# Patient Record
Sex: FEMALE | Race: BLACK OR AFRICAN AMERICAN | NOT HISPANIC OR LATINO | URBAN - METROPOLITAN AREA
[De-identification: names, ages, dates, MRNs, and addresses within clinical notes are randomized per-mention and may not be internally consistent; named-entity substitution may affect disease eponyms.]

---

## 2018-08-27 ENCOUNTER — OUTPATIENT (OUTPATIENT)
Dept: OUTPATIENT SERVICES | Facility: HOSPITAL | Age: 23
LOS: 1 days | End: 2018-08-27

## 2018-08-27 ENCOUNTER — EMERGENCY (EMERGENCY)
Facility: HOSPITAL | Age: 23
LOS: 1 days | Discharge: HOME | End: 2018-08-27
Attending: EMERGENCY MEDICINE | Admitting: EMERGENCY MEDICINE
Payer: MEDICAID

## 2018-08-27 ENCOUNTER — RESULT CHARGE (OUTPATIENT)
Age: 23
End: 2018-08-27

## 2018-08-27 ENCOUNTER — APPOINTMENT (OUTPATIENT)
Dept: OBGYN | Facility: HOSPITAL | Age: 23
End: 2018-08-27

## 2018-08-27 VITALS
HEART RATE: 82 BPM | OXYGEN SATURATION: 100 % | SYSTOLIC BLOOD PRESSURE: 122 MMHG | DIASTOLIC BLOOD PRESSURE: 82 MMHG | RESPIRATION RATE: 18 BRPM | TEMPERATURE: 98 F

## 2018-08-27 VITALS
TEMPERATURE: 98 F | RESPIRATION RATE: 18 BRPM | HEART RATE: 74 BPM | DIASTOLIC BLOOD PRESSURE: 83 MMHG | SYSTOLIC BLOOD PRESSURE: 134 MMHG | OXYGEN SATURATION: 100 %

## 2018-08-27 DIAGNOSIS — N91.2 AMENORRHEA, UNSPECIFIED: ICD-10-CM

## 2018-08-27 PROBLEM — Z00.00 ENCOUNTER FOR PREVENTIVE HEALTH EXAMINATION: Status: ACTIVE | Noted: 2018-08-27

## 2018-08-27 LAB
ALBUMIN SERPL ELPH-MCNC: 3.7 G/DL — SIGNIFICANT CHANGE UP (ref 3.3–5)
ALP SERPL-CCNC: 110 U/L — SIGNIFICANT CHANGE UP (ref 40–120)
ALT FLD-CCNC: 19 U/L — SIGNIFICANT CHANGE UP (ref 4–33)
APPEARANCE UR: CLEAR — SIGNIFICANT CHANGE UP
AST SERPL-CCNC: 15 U/L — SIGNIFICANT CHANGE UP (ref 4–32)
BACTERIA # UR AUTO: SIGNIFICANT CHANGE UP
BASOPHILS # BLD AUTO: 0.02 K/UL — SIGNIFICANT CHANGE UP (ref 0–0.2)
BASOPHILS # BLD AUTO: 0.04 K/UL — SIGNIFICANT CHANGE UP (ref 0–0.2)
BASOPHILS NFR BLD AUTO: 0.2 % — SIGNIFICANT CHANGE UP (ref 0–2)
BASOPHILS NFR BLD AUTO: 0.4 % — SIGNIFICANT CHANGE UP (ref 0–2)
BILIRUB SERPL-MCNC: < 0.2 MG/DL — LOW (ref 0.2–1.2)
BILIRUB UR-MCNC: NEGATIVE — SIGNIFICANT CHANGE UP
BLD GP AB SCN SERPL QL: NEGATIVE — SIGNIFICANT CHANGE UP
BLOOD UR QL VISUAL: NEGATIVE — SIGNIFICANT CHANGE UP
BUN SERPL-MCNC: 8 MG/DL — SIGNIFICANT CHANGE UP (ref 7–23)
CALCIUM SERPL-MCNC: 9.2 MG/DL — SIGNIFICANT CHANGE UP (ref 8.4–10.5)
CHLORIDE SERPL-SCNC: 102 MMOL/L — SIGNIFICANT CHANGE UP (ref 98–107)
CO2 SERPL-SCNC: 22 MMOL/L — SIGNIFICANT CHANGE UP (ref 22–31)
COD CRY URNS QL: SIGNIFICANT CHANGE UP (ref 0–0)
COLOR SPEC: YELLOW — SIGNIFICANT CHANGE UP
CREAT SERPL-MCNC: 0.54 MG/DL — SIGNIFICANT CHANGE UP (ref 0.5–1.3)
EOSINOPHIL # BLD AUTO: 0.21 K/UL — SIGNIFICANT CHANGE UP (ref 0–0.5)
EOSINOPHIL # BLD AUTO: 0.22 K/UL — SIGNIFICANT CHANGE UP (ref 0–0.5)
EOSINOPHIL NFR BLD AUTO: 2.3 % — SIGNIFICANT CHANGE UP (ref 0–6)
EOSINOPHIL NFR BLD AUTO: 2.6 % — SIGNIFICANT CHANGE UP (ref 0–6)
FERRITIN SERPL-MCNC: 25.56 NG/ML — SIGNIFICANT CHANGE UP (ref 15–150)
GLUCOSE SERPL-MCNC: 68 MG/DL — LOW (ref 70–99)
GLUCOSE UR-MCNC: NEGATIVE — SIGNIFICANT CHANGE UP
HCG UR QL: POSITIVE
HCG UR-SCNC: POSITIVE — SIGNIFICANT CHANGE UP
HCT VFR BLD CALC: 36.7 % — SIGNIFICANT CHANGE UP (ref 34.5–45)
HCT VFR BLD CALC: 37.1 % — SIGNIFICANT CHANGE UP (ref 34.5–45)
HGB BLD-MCNC: 12.8 G/DL — SIGNIFICANT CHANGE UP (ref 11.5–15.5)
HGB BLD-MCNC: 13.1 G/DL — SIGNIFICANT CHANGE UP (ref 11.5–15.5)
HYALINE CASTS # UR AUTO: NEGATIVE — SIGNIFICANT CHANGE UP
IMM GRANULOCYTES # BLD AUTO: 0.05 # — SIGNIFICANT CHANGE UP
IMM GRANULOCYTES # BLD AUTO: 0.06 # — SIGNIFICANT CHANGE UP
IMM GRANULOCYTES NFR BLD AUTO: 0.6 % — SIGNIFICANT CHANGE UP (ref 0–1.5)
IMM GRANULOCYTES NFR BLD AUTO: 0.7 % — SIGNIFICANT CHANGE UP (ref 0–1.5)
IRON SATN MFR SERPL: 306 UG/DL — SIGNIFICANT CHANGE UP (ref 140–530)
IRON SATN MFR SERPL: 46 UG/DL — SIGNIFICANT CHANGE UP (ref 30–160)
KETONES UR-MCNC: NEGATIVE — SIGNIFICANT CHANGE UP
LEUKOCYTE ESTERASE UR-ACNC: NEGATIVE — SIGNIFICANT CHANGE UP
LYMPHOCYTES # BLD AUTO: 2.28 K/UL — SIGNIFICANT CHANGE UP (ref 1–3.3)
LYMPHOCYTES # BLD AUTO: 2.51 K/UL — SIGNIFICANT CHANGE UP (ref 1–3.3)
LYMPHOCYTES # BLD AUTO: 25.2 % — SIGNIFICANT CHANGE UP (ref 13–44)
LYMPHOCYTES # BLD AUTO: 29.6 % — SIGNIFICANT CHANGE UP (ref 13–44)
MCHC RBC-ENTMCNC: 26.5 PG — LOW (ref 27–34)
MCHC RBC-ENTMCNC: 26.6 PG — LOW (ref 27–34)
MCHC RBC-ENTMCNC: 34.9 % — SIGNIFICANT CHANGE UP (ref 32–36)
MCHC RBC-ENTMCNC: 35.3 % — SIGNIFICANT CHANGE UP (ref 32–36)
MCV RBC AUTO: 75.4 FL — LOW (ref 80–100)
MCV RBC AUTO: 76 FL — LOW (ref 80–100)
MONOCYTES # BLD AUTO: 0.53 K/UL — SIGNIFICANT CHANGE UP (ref 0–0.9)
MONOCYTES # BLD AUTO: 0.98 K/UL — HIGH (ref 0–0.9)
MONOCYTES NFR BLD AUTO: 10.9 % — SIGNIFICANT CHANGE UP (ref 2–14)
MONOCYTES NFR BLD AUTO: 6.3 % — SIGNIFICANT CHANGE UP (ref 2–14)
MUCOUS THREADS # UR AUTO: SIGNIFICANT CHANGE UP
NEUTROPHILS # BLD AUTO: 5.13 K/UL — SIGNIFICANT CHANGE UP (ref 1.8–7.4)
NEUTROPHILS # BLD AUTO: 5.47 K/UL — SIGNIFICANT CHANGE UP (ref 1.8–7.4)
NEUTROPHILS NFR BLD AUTO: 60.6 % — SIGNIFICANT CHANGE UP (ref 43–77)
NEUTROPHILS NFR BLD AUTO: 60.6 % — SIGNIFICANT CHANGE UP (ref 43–77)
NITRITE UR-MCNC: NEGATIVE — SIGNIFICANT CHANGE UP
NRBC # FLD: 0 — SIGNIFICANT CHANGE UP
NRBC # FLD: 0 — SIGNIFICANT CHANGE UP
PH UR: 6 — SIGNIFICANT CHANGE UP (ref 5–8)
PLATELET # BLD AUTO: 298 K/UL — SIGNIFICANT CHANGE UP (ref 150–400)
PLATELET # BLD AUTO: 306 K/UL — SIGNIFICANT CHANGE UP (ref 150–400)
PMV BLD: 10.9 FL — SIGNIFICANT CHANGE UP (ref 7–13)
PMV BLD: 11.6 FL — SIGNIFICANT CHANGE UP (ref 7–13)
POTASSIUM SERPL-MCNC: 3.9 MMOL/L — SIGNIFICANT CHANGE UP (ref 3.5–5.3)
POTASSIUM SERPL-SCNC: 3.9 MMOL/L — SIGNIFICANT CHANGE UP (ref 3.5–5.3)
PROT SERPL-MCNC: 6.9 G/DL — SIGNIFICANT CHANGE UP (ref 6–8.3)
PROT UR-MCNC: SIGNIFICANT CHANGE UP
QUALITY CONTROL: YES
RBC # BLD: 4.83 M/UL — SIGNIFICANT CHANGE UP (ref 3.8–5.2)
RBC # BLD: 4.92 M/UL — SIGNIFICANT CHANGE UP (ref 3.8–5.2)
RBC # FLD: 15.6 % — HIGH (ref 10.3–14.5)
RBC # FLD: 15.7 % — HIGH (ref 10.3–14.5)
RBC CASTS # UR COMP ASSIST: SIGNIFICANT CHANGE UP (ref 0–?)
RH IG SCN BLD-IMP: POSITIVE — SIGNIFICANT CHANGE UP
SODIUM SERPL-SCNC: 135 MMOL/L — SIGNIFICANT CHANGE UP (ref 135–145)
SP GR SPEC: 1.03 — SIGNIFICANT CHANGE UP (ref 1–1.04)
SQUAMOUS # UR AUTO: SIGNIFICANT CHANGE UP
UIBC SERPL-MCNC: 259.8 UG/DL — SIGNIFICANT CHANGE UP (ref 110–370)
UROBILINOGEN FLD QL: NORMAL — SIGNIFICANT CHANGE UP
WBC # BLD: 8.47 K/UL — SIGNIFICANT CHANGE UP (ref 3.8–10.5)
WBC # BLD: 9.03 K/UL — SIGNIFICANT CHANGE UP (ref 3.8–10.5)
WBC # FLD AUTO: 8.47 K/UL — SIGNIFICANT CHANGE UP (ref 3.8–10.5)
WBC # FLD AUTO: 9.03 K/UL — SIGNIFICANT CHANGE UP (ref 3.8–10.5)
WBC UR QL: SIGNIFICANT CHANGE UP (ref 0–?)

## 2018-08-27 PROCEDURE — 76830 TRANSVAGINAL US NON-OB: CPT | Mod: 26

## 2018-08-27 PROCEDURE — 99284 EMERGENCY DEPT VISIT MOD MDM: CPT

## 2018-08-27 RX ORDER — DIPHENHYDRAMINE HCL 50 MG
25 CAPSULE ORAL ONCE
Qty: 0 | Refills: 0 | Status: COMPLETED | OUTPATIENT
Start: 2018-08-27 | End: 2018-08-27

## 2018-08-27 RX ADMIN — Medication 30 MILLILITER(S): at 16:13

## 2018-08-27 NOTE — ED ADULT NURSE NOTE - NSIMPLEMENTINTERV_GEN_ALL_ED
Implemented All Universal Safety Interventions:  [place] to call system. Call bell, personal items and telephone within reach. Instruct patient to call for assistance. Room bathroom lighting operational. Non-slip footwear when patient is off stretcher. Physically safe environment: no spills, clutter or unnecessary equipment. Stretcher in lowest position, wheels locked, appropriate side rails in place.

## 2018-08-27 NOTE — ED PROVIDER NOTE - CARE PLAN
Principal Discharge DX:	Hematemesis  Assessment and plan of treatment:	1) Please return to the ED should you have any new or worsening symptoms, worsening pain, develop bloody vomiting or diarrhea, vaginal bleeding, or fevers.  2) Please follow up with OB/GYN. Please call [phone] to make an appointment in 3-5 days. Please call and tell them you were seen in ED and need an earlier appointment.   3) Please discontinue your Iron supplements but continue your prenatal vitamins

## 2018-08-27 NOTE — ED PROVIDER NOTE - GASTROINTESTINAL, MLM
Abdomen soft, epigastric tenderness w/ guarding. Mild left upper quadrant tenderness. No lower abd tenderness.

## 2018-08-27 NOTE — ED ADULT NURSE NOTE - OBJECTIVE STATEMENT
pt rec'd in intake, c/o epigastric pain and vomiting blood, approximately 16 weeks pregnant with no prenatal care thus far.

## 2018-08-27 NOTE — ED PROVIDER NOTE - PROGRESS NOTE DETAILS
Zachary Negron (Resident): Hgb stable - d/w patient to stop iron and return for persistent symptoms, chest pain, bloody diarrhea/stool - will get sooner appt with OB - safe to d/c

## 2018-08-27 NOTE — ED ADULT NURSE NOTE - CHIEF COMPLAINT QUOTE
pt states she is pregnant, LMP may 9th. 15.5 weeks, has not had prenatal care as of yet. pt states she has had pain LLQ for the past 3 days . pt states she also had vomit with 'a lot blood' in it 3 days ago and again today.

## 2018-08-27 NOTE — ED PROVIDER NOTE - OBJECTIVE STATEMENT
23 y/o female pregnant LMP sometime in May, never had any prenatal workup 2/2 to difficulty making an appointment presents with hematemesis. States for past 2 days had some epigastric pain w/ 2x episodes of hematemesis. Has a photo of the vomit, which has a large amount of blood in it. Also reports some dark stool today. Reports epigastric pain wrapping around to the L flank. No lower abd pain. No dysuria or hematuria. Has been taking pre-chandni vitamins as well as iron, stopped the iron yesterday. Doesn't take NSAIDs ever. No hx of ulcers.

## 2018-08-27 NOTE — ED PROVIDER NOTE - PLAN OF CARE
1) Please return to the ED should you have any new or worsening symptoms, worsening pain, develop bloody vomiting or diarrhea, vaginal bleeding, or fevers.  2) Please follow up with OB/GYN. Please call [phone] to make an appointment in 3-5 days. Please call and tell them you were seen in ED and need an earlier appointment.   3) Please discontinue your Iron supplements but continue your prenatal vitamins

## 2018-08-27 NOTE — ED ADULT TRIAGE NOTE - CHIEF COMPLAINT QUOTE
pt states she is pregnant, LMP may 9th. 15.5 weeks, has not had prenatal care as of yet. pt states she has had pain LLQ for the past 3 days . pt states she also had vomit with a lot blood in it 3 days ago and again today. pt states she is pregnant, LMP may 9th. 15.5 weeks, has not had prenatal care as of yet. pt states she has had pain LLQ for the past 3 days . pt states she also had vomit with 'a lot blood' in it 3 days ago and again today.

## 2018-08-27 NOTE — ED PROVIDER NOTE - ATTENDING CONTRIBUTION TO CARE
AJM: Patient seen with resident and agree with above note. 21 y/o female pregnant LMP sometime in May, never had any prenatal workup 2/2 to difficulty making an appointment presents with hematemesis. States for past 2 days had some epigastric pain w/ 2x episodes of hematemesis. Has a photo of the vomit, which has a large amount of blood in it. Also reports some dark stool today. Reports epigastric pain wrapping around to the L flank. No lower abd pain. No dysuria or hematuria. Has been taking pre- vitamins as well as iron, stopped the iron yesterday. Doesn't take NSAIDs ever. No hx of ulcers. Benign abd exam. No distress, tolerating PO. concern for gastritis, possibly from too much iron. will send iron studies. advised to stop iron supplements. labs, hcg, ua, gi ccoktail. will observe in ed for repeat cbc. if normal will demar pierce home with gi followup.

## 2018-08-27 NOTE — ED PROVIDER NOTE - MEDICAL DECISION MAKING DETAILS
Zachary Negron (Resident): Healthy 21 y/o  unsure of exact LMP, likely between 13-16 weeks pregnant presents with hematemesis - been taking Iron w/ Prenatals, likely which also have iron in them - concern for a gastritis from iron vs PUD - looks wlel, vitals WNL - has photo of blood - will check labs, Sono of baby b/c unsure of dating, urine, iron studies, maalox, and dispo accordingly - may not need to stay, but would consider atleast performing a repeat CBC to ensure not becoming more anemic

## 2018-08-29 LAB
BACTERIA UR CULT: SIGNIFICANT CHANGE UP
SPECIMEN SOURCE: SIGNIFICANT CHANGE UP

## 2018-08-30 NOTE — ED POST DISCHARGE NOTE - DETAILS
[phone] - Discussed UCX result with patient.  Recommended repeat UA/UCX with OBGYN.  Pt has a follow up kai scheduled and will call back with OBGYN name/fax# to send results.

## 2018-09-18 NOTE — ED ADULT TRIAGE NOTE - TEMPERATURE IN CELSIUS (DEGREES C)
Cristel Mancuso  [address] [id number]            9/18/2018      Dear Cristel,    This letter is a reminder that you are due for a colonoscopy. I would like to share some important information about colorectal cancer. Colorectal cancer is a leading cause of cancer death in this country. Colorectal cancer can be stopped in its tracks or detected early with preventative testing. Please call [phone] to schedule an appointment with Dr. Mathew, Dr. Neely, Dr. Bravo, or Dr. Cai which may be scheduled at [facility] in [place] or [facility] in [place].    Your good health is important to us-colorectal cancer screening is important for you.     Sincerely,    Dr. Abram Neely M.D.  Gastroenterology  [facility]  [address] [place] [address]   36.8

## 2018-09-25 ENCOUNTER — APPOINTMENT (OUTPATIENT)
Dept: OBGYN | Facility: HOSPITAL | Age: 23
End: 2018-09-25

## 2018-10-01 ENCOUNTER — RESULT REVIEW (OUTPATIENT)
Age: 23
End: 2018-10-01

## 2018-10-02 ENCOUNTER — NON-APPOINTMENT (OUTPATIENT)
Age: 23
End: 2018-10-02

## 2018-10-02 ENCOUNTER — LABORATORY RESULT (OUTPATIENT)
Age: 23
End: 2018-10-02

## 2018-10-02 ENCOUNTER — APPOINTMENT (OUTPATIENT)
Dept: OBGYN | Facility: HOSPITAL | Age: 23
End: 2018-10-02

## 2018-10-02 ENCOUNTER — OUTPATIENT (OUTPATIENT)
Dept: OUTPATIENT SERVICES | Facility: HOSPITAL | Age: 23
LOS: 1 days | End: 2018-10-02
Payer: MEDICAID

## 2018-10-02 VITALS — HEART RATE: 105 BPM | WEIGHT: 190 LBS | BODY MASS INDEX: 32.44 KG/M2 | HEIGHT: 64 IN

## 2018-10-02 VITALS — DIASTOLIC BLOOD PRESSURE: 68 MMHG | SYSTOLIC BLOOD PRESSURE: 118 MMHG

## 2018-10-02 DIAGNOSIS — Z34.90 ENCOUNTER FOR SUPERVISION OF NORMAL PREGNANCY, UNSPECIFIED, UNSPECIFIED TRIMESTER: ICD-10-CM

## 2018-10-02 DIAGNOSIS — Z23 ENCOUNTER FOR IMMUNIZATION: ICD-10-CM

## 2018-10-02 DIAGNOSIS — Z83.3 FAMILY HISTORY OF DIABETES MELLITUS: ICD-10-CM

## 2018-10-02 DIAGNOSIS — Z87.42 PERSONAL HISTORY OF OTHER DISEASES OF THE FEMALE GENITAL TRACT: ICD-10-CM

## 2018-10-02 DIAGNOSIS — Z34.92 ENCOUNTER FOR SUPERVISION OF NORMAL PREGNANCY, UNSPECIFIED, SECOND TRIMESTER: ICD-10-CM

## 2018-10-02 LAB
ALBUMIN SERPL ELPH-MCNC: 4.1 G/DL — SIGNIFICANT CHANGE UP (ref 3.3–5)
ALP SERPL-CCNC: 110 U/L — SIGNIFICANT CHANGE UP (ref 40–120)
ALT FLD-CCNC: 13 U/L — SIGNIFICANT CHANGE UP (ref 4–33)
APPEARANCE UR: CLEAR — SIGNIFICANT CHANGE UP
AST SERPL-CCNC: 11 U/L — SIGNIFICANT CHANGE UP (ref 4–32)
BACTERIA # UR AUTO: NEGATIVE — SIGNIFICANT CHANGE UP
BASOPHILS # BLD AUTO: 0.02 K/UL — SIGNIFICANT CHANGE UP (ref 0–0.2)
BASOPHILS NFR BLD AUTO: 0.3 % — SIGNIFICANT CHANGE UP (ref 0–2)
BILIRUB SERPL-MCNC: 0.2 MG/DL — SIGNIFICANT CHANGE UP (ref 0.2–1.2)
BILIRUB UR-MCNC: NEGATIVE — SIGNIFICANT CHANGE UP
BLOOD UR QL VISUAL: NEGATIVE — SIGNIFICANT CHANGE UP
BUN SERPL-MCNC: 7 MG/DL — SIGNIFICANT CHANGE UP (ref 7–23)
CALCIUM SERPL-MCNC: 9.6 MG/DL — SIGNIFICANT CHANGE UP (ref 8.4–10.5)
CHLORIDE SERPL-SCNC: 102 MMOL/L — SIGNIFICANT CHANGE UP (ref 98–107)
CO2 SERPL-SCNC: 24 MMOL/L — SIGNIFICANT CHANGE UP (ref 22–31)
COLOR SPEC: YELLOW — SIGNIFICANT CHANGE UP
CREAT SERPL-MCNC: 0.57 MG/DL — SIGNIFICANT CHANGE UP (ref 0.5–1.3)
EOSINOPHIL # BLD AUTO: 0.14 K/UL — SIGNIFICANT CHANGE UP (ref 0–0.5)
EOSINOPHIL NFR BLD AUTO: 1.8 % — SIGNIFICANT CHANGE UP (ref 0–6)
GLUCOSE SERPL-MCNC: 61 MG/DL — LOW (ref 70–99)
GLUCOSE UR-MCNC: NEGATIVE — SIGNIFICANT CHANGE UP
HBA1C BLD-MCNC: 5 % — SIGNIFICANT CHANGE UP (ref 4–5.6)
HCT VFR BLD CALC: 37.9 % — SIGNIFICANT CHANGE UP (ref 34.5–45)
HGB BLD-MCNC: 13.1 G/DL — SIGNIFICANT CHANGE UP (ref 11.5–15.5)
HYALINE CASTS # UR AUTO: NEGATIVE — SIGNIFICANT CHANGE UP
IMM GRANULOCYTES # BLD AUTO: 0.07 # — SIGNIFICANT CHANGE UP
IMM GRANULOCYTES NFR BLD AUTO: 0.9 % — SIGNIFICANT CHANGE UP (ref 0–1.5)
KETONES UR-MCNC: NEGATIVE — SIGNIFICANT CHANGE UP
LEUKOCYTE ESTERASE UR-ACNC: SIGNIFICANT CHANGE UP
LYMPHOCYTES # BLD AUTO: 1.68 K/UL — SIGNIFICANT CHANGE UP (ref 1–3.3)
LYMPHOCYTES # BLD AUTO: 21.7 % — SIGNIFICANT CHANGE UP (ref 13–44)
MCHC RBC-ENTMCNC: 27 PG — SIGNIFICANT CHANGE UP (ref 27–34)
MCHC RBC-ENTMCNC: 34.6 % — SIGNIFICANT CHANGE UP (ref 32–36)
MCV RBC AUTO: 78 FL — LOW (ref 80–100)
MONOCYTES # BLD AUTO: 0.68 K/UL — SIGNIFICANT CHANGE UP (ref 0–0.9)
MONOCYTES NFR BLD AUTO: 8.8 % — SIGNIFICANT CHANGE UP (ref 2–14)
NEUTROPHILS # BLD AUTO: 5.16 K/UL — SIGNIFICANT CHANGE UP (ref 1.8–7.4)
NEUTROPHILS NFR BLD AUTO: 66.5 % — SIGNIFICANT CHANGE UP (ref 43–77)
NITRITE UR-MCNC: NEGATIVE — SIGNIFICANT CHANGE UP
NRBC # FLD: 0 — SIGNIFICANT CHANGE UP
PH UR: 6 — SIGNIFICANT CHANGE UP (ref 5–8)
PLATELET # BLD AUTO: 347 K/UL — SIGNIFICANT CHANGE UP (ref 150–400)
PMV BLD: 12.3 FL — SIGNIFICANT CHANGE UP (ref 7–13)
POTASSIUM SERPL-MCNC: 3.9 MMOL/L — SIGNIFICANT CHANGE UP (ref 3.5–5.3)
POTASSIUM SERPL-SCNC: 3.9 MMOL/L — SIGNIFICANT CHANGE UP (ref 3.5–5.3)
PROT SERPL-MCNC: 7.2 G/DL — SIGNIFICANT CHANGE UP (ref 6–8.3)
PROT UR-MCNC: 20 — SIGNIFICANT CHANGE UP
RBC # BLD: 4.86 M/UL — SIGNIFICANT CHANGE UP (ref 3.8–5.2)
RBC # FLD: 15.5 % — HIGH (ref 10.3–14.5)
RBC CASTS # UR COMP ASSIST: SIGNIFICANT CHANGE UP (ref 0–?)
SODIUM SERPL-SCNC: 140 MMOL/L — SIGNIFICANT CHANGE UP (ref 135–145)
SP GR SPEC: 1.03 — SIGNIFICANT CHANGE UP (ref 1–1.04)
SQUAMOUS # UR AUTO: SIGNIFICANT CHANGE UP
URATE SERPL-MCNC: 3.5 MG/DL — SIGNIFICANT CHANGE UP (ref 2.5–7)
UROBILINOGEN FLD QL: NORMAL — SIGNIFICANT CHANGE UP
WBC # BLD: 7.75 K/UL — SIGNIFICANT CHANGE UP (ref 3.8–10.5)
WBC # FLD AUTO: 7.75 K/UL — SIGNIFICANT CHANGE UP (ref 3.8–10.5)
WBC UR QL: SIGNIFICANT CHANGE UP (ref 0–?)

## 2018-10-02 PROCEDURE — 83020 HEMOGLOBIN ELECTROPHORESIS: CPT | Mod: 26

## 2018-10-02 PROCEDURE — G0452: CPT

## 2018-10-02 RX ORDER — PRENATAL WITH FERROUS FUM AND FOLIC ACID 3080; 920; 120; 400; 22; 1.84; 3; 20; 10; 1; 12; 200; 27; 25; 2 [IU]/1; [IU]/1; MG/1; [IU]/1; MG/1; MG/1; MG/1; MG/1; MG/1; MG/1; UG/1; MG/1; MG/1; MG/1; MG/1
27-1 TABLET ORAL
Qty: 1 | Refills: 0 | Status: ACTIVE | OUTPATIENT
Start: 2018-10-02

## 2018-10-03 LAB
AFP-TM SERPL-MCNC: 42.3 NG/ML — HIGH
C TRACH RRNA SPEC QL NAA+PROBE: SIGNIFICANT CHANGE UP
HBV SURFACE AG SER-ACNC: NONREACTIVE — SIGNIFICANT CHANGE UP
HCV AB S/CO SERPL IA: 0.08 S/CO — SIGNIFICANT CHANGE UP
HCV AB SERPL-IMP: SIGNIFICANT CHANGE UP
HGB A MFR BLD: 59.9 % — LOW
HGB A2 MFR BLD: 3.4 % — SIGNIFICANT CHANGE UP (ref 2.4–3.5)
HGB C MFR BLD: 38 % — HIGH (ref 0–0)
HGB ELECT COMMENT: SIGNIFICANT CHANGE UP
HGB F MFR BLD: < 1 % — SIGNIFICANT CHANGE UP (ref 0–1.5)
HGB S BLD QL: NEGATIVE — SIGNIFICANT CHANGE UP
HIV 1+2 AB+HIV1 P24 AG SERPL QL IA: SIGNIFICANT CHANGE UP
N GONORRHOEA RRNA SPEC QL NAA+PROBE: SIGNIFICANT CHANGE UP
RUBV IGG SER-ACNC: 2.3 INDEX — SIGNIFICANT CHANGE UP
RUBV IGG SER-IMP: POSITIVE — SIGNIFICANT CHANGE UP
SOLUBILITY: NEGATIVE — SIGNIFICANT CHANGE UP
SPECIMEN SOURCE: SIGNIFICANT CHANGE UP
T PALLIDUM AB TITR SER: NEGATIVE — SIGNIFICANT CHANGE UP
VZV IGG FLD QL IA: 52.6 INDEX — SIGNIFICANT CHANGE UP
VZV IGG FLD QL IA: NEGATIVE — SIGNIFICANT CHANGE UP

## 2018-10-04 LAB
BACTERIA UR CULT: SIGNIFICANT CHANGE UP
GAMMA INTERFERON BACKGROUND BLD IA-ACNC: 0.02 IU/ML — SIGNIFICANT CHANGE UP
LEAD SERPL-MCNC: < 1 UG/DL — SIGNIFICANT CHANGE UP (ref 0–4)
M TB IFN-G BLD-IMP: NEGATIVE — SIGNIFICANT CHANGE UP
M TB IFN-G CD4+ BCKGRND COR BLD-ACNC: 0.03 IU/ML — SIGNIFICANT CHANGE UP
M TB IFN-G CD4+CD8+ BCKGRND COR BLD-ACNC: 0.01 IU/ML — SIGNIFICANT CHANGE UP
QUANT TB PLUS MITOGEN MINUS NIL: 6.6 IU/ML — SIGNIFICANT CHANGE UP
SPECIMEN SOURCE: SIGNIFICANT CHANGE UP

## 2018-10-05 LAB
2ND TRIMESTER DATA: SIGNIFICANT CHANGE UP
AFP SERPL-ACNC: SIGNIFICANT CHANGE UP
B-HCG FREE SERPL-MCNC: SIGNIFICANT CHANGE UP
CLINICAL BIOCHEMIST REVIEW: SIGNIFICANT CHANGE UP
CYTOLOGY SPEC DOC CYTO: SIGNIFICANT CHANGE UP
DEMOGRAPHIC DATA: SIGNIFICANT CHANGE UP
INHIBIN A SERPL-MCNC: SIGNIFICANT CHANGE UP
SCREEN-FOOTER: SIGNIFICANT CHANGE UP
U ESTRIOL SERPL-SCNC: SIGNIFICANT CHANGE UP

## 2018-10-06 ENCOUNTER — TRANSCRIPTION ENCOUNTER (OUTPATIENT)
Age: 23
End: 2018-10-06

## 2018-10-23 ENCOUNTER — NON-APPOINTMENT (OUTPATIENT)
Age: 23
End: 2018-10-23

## 2018-10-23 ENCOUNTER — APPOINTMENT (OUTPATIENT)
Dept: ULTRASOUND IMAGING | Facility: HOSPITAL | Age: 23
End: 2018-10-23

## 2018-10-23 ENCOUNTER — OUTPATIENT (OUTPATIENT)
Dept: OUTPATIENT SERVICES | Facility: HOSPITAL | Age: 23
LOS: 1 days | End: 2018-10-23

## 2018-10-23 ENCOUNTER — APPOINTMENT (OUTPATIENT)
Dept: OBGYN | Facility: HOSPITAL | Age: 23
End: 2018-10-23

## 2018-10-23 VITALS — WEIGHT: 191 LBS | SYSTOLIC BLOOD PRESSURE: 110 MMHG | DIASTOLIC BLOOD PRESSURE: 70 MMHG | HEART RATE: 100 BPM

## 2018-10-23 DIAGNOSIS — B37.9 CANDIDIASIS, UNSPECIFIED: ICD-10-CM

## 2018-10-23 DIAGNOSIS — Z34.92 ENCOUNTER FOR SUPERVISION OF NORMAL PREGNANCY, UNSPECIFIED, SECOND TRIMESTER: ICD-10-CM

## 2018-10-23 DIAGNOSIS — D58.2 OTHER HEMOGLOBINOPATHIES: ICD-10-CM

## 2018-10-23 DIAGNOSIS — N76.0 ACUTE VAGINITIS: ICD-10-CM

## 2018-10-23 LAB — CFTR MUT ANL BLD/T: NEGATIVE — SIGNIFICANT CHANGE UP

## 2018-10-23 PROCEDURE — 76805 OB US >/= 14 WKS SNGL FETUS: CPT | Mod: 26

## 2018-11-20 ENCOUNTER — OUTPATIENT (OUTPATIENT)
Dept: OUTPATIENT SERVICES | Facility: HOSPITAL | Age: 23
LOS: 1 days | End: 2018-11-20

## 2018-11-20 ENCOUNTER — NON-APPOINTMENT (OUTPATIENT)
Age: 23
End: 2018-11-20

## 2018-11-20 ENCOUNTER — APPOINTMENT (OUTPATIENT)
Dept: OBGYN | Facility: HOSPITAL | Age: 23
End: 2018-11-20

## 2018-11-20 VITALS — DIASTOLIC BLOOD PRESSURE: 76 MMHG | SYSTOLIC BLOOD PRESSURE: 122 MMHG | WEIGHT: 194.34 LBS | HEART RATE: 90 BPM

## 2018-11-20 DIAGNOSIS — Z34.92 ENCOUNTER FOR SUPERVISION OF NORMAL PREGNANCY, UNSPECIFIED, SECOND TRIMESTER: ICD-10-CM

## 2018-11-20 DIAGNOSIS — D58.2 OTHER HEMOGLOBINOPATHIES: ICD-10-CM

## 2018-12-02 ENCOUNTER — EMERGENCY (EMERGENCY)
Facility: HOSPITAL | Age: 23
LOS: 1 days | Discharge: HOME | End: 2018-12-02
Admitting: EMERGENCY MEDICINE
Payer: MEDICAID

## 2018-12-02 VITALS
DIASTOLIC BLOOD PRESSURE: 80 MMHG | OXYGEN SATURATION: 100 % | SYSTOLIC BLOOD PRESSURE: 138 MMHG | RESPIRATION RATE: 16 BRPM | HEART RATE: 79 BPM | TEMPERATURE: 99 F

## 2018-12-02 PROCEDURE — 99282 EMERGENCY DEPT VISIT SF MDM: CPT

## 2018-12-02 NOTE — ED ADULT TRIAGE NOTE - CHIEF COMPLAINT QUOTE
pt c/o laceration to right 4th digit with a knife while cooking. bleeding noted. dry dressing in place. pt 25 weeks pregnant..

## 2018-12-02 NOTE — ED PROVIDER NOTE - OBJECTIVE STATEMENT
21 y/o female right hand dominant c/o lac to right ring finger. States she was slicing onions on a mandolin ans accidentally cut her finger. Denies numbness, decreased rom, other injuries.

## 2018-12-04 ENCOUNTER — EMERGENCY (EMERGENCY)
Facility: HOSPITAL | Age: 23
LOS: 1 days | End: 2018-12-04
Attending: EMERGENCY MEDICINE
Payer: SELF-PAY

## 2018-12-04 ENCOUNTER — EMERGENCY (EMERGENCY)
Facility: HOSPITAL | Age: 23
LOS: 1 days | Discharge: ANOTHER HOSPITAL | End: 2018-12-04
Attending: EMERGENCY MEDICINE | Admitting: EMERGENCY MEDICINE
Payer: COMMERCIAL

## 2018-12-04 VITALS
OXYGEN SATURATION: 100 % | HEART RATE: 118 BPM | DIASTOLIC BLOOD PRESSURE: 76 MMHG | TEMPERATURE: 98 F | SYSTOLIC BLOOD PRESSURE: 128 MMHG | RESPIRATION RATE: 22 BRPM

## 2018-12-04 VITALS
RESPIRATION RATE: 20 BRPM | SYSTOLIC BLOOD PRESSURE: 131 MMHG | HEART RATE: 125 BPM | DIASTOLIC BLOOD PRESSURE: 88 MMHG | OXYGEN SATURATION: 98 % | TEMPERATURE: 98 F

## 2018-12-04 VITALS
HEART RATE: 105 BPM | OXYGEN SATURATION: 100 % | DIASTOLIC BLOOD PRESSURE: 72 MMHG | TEMPERATURE: 98 F | SYSTOLIC BLOOD PRESSURE: 136 MMHG | RESPIRATION RATE: 16 BRPM

## 2018-12-04 VITALS
OXYGEN SATURATION: 99 % | TEMPERATURE: 99 F | SYSTOLIC BLOOD PRESSURE: 121 MMHG | RESPIRATION RATE: 15 BRPM | HEART RATE: 109 BPM | DIASTOLIC BLOOD PRESSURE: 61 MMHG
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
## 2018-12-04 DIAGNOSIS — Z98.890 OTHER SPECIFIED POSTPROCEDURAL STATES: Chronic | ICD-10-CM

## 2018-12-04 LAB
ALBUMIN SERPL ELPH-MCNC: 3.6 G/DL — SIGNIFICANT CHANGE UP (ref 3.3–5)
ALP SERPL-CCNC: 129 U/L — HIGH (ref 40–120)
ALT FLD-CCNC: 13 U/L — SIGNIFICANT CHANGE UP (ref 4–33)
APTT BLD: 27.2 SEC — LOW (ref 27.5–36.3)
AST SERPL-CCNC: 16 U/L — SIGNIFICANT CHANGE UP (ref 4–32)
BASOPHILS # BLD AUTO: 0.03 K/UL — SIGNIFICANT CHANGE UP (ref 0–0.2)
BASOPHILS NFR BLD AUTO: 0.4 % — SIGNIFICANT CHANGE UP (ref 0–2)
BILIRUB SERPL-MCNC: < 0.2 MG/DL — LOW (ref 0.2–1.2)
BLD GP AB SCN SERPL QL: NEGATIVE — SIGNIFICANT CHANGE UP
BUN SERPL-MCNC: 4 MG/DL — LOW (ref 7–23)
CALCIUM SERPL-MCNC: 9.2 MG/DL — SIGNIFICANT CHANGE UP (ref 8.4–10.5)
CHLORIDE SERPL-SCNC: 102 MMOL/L — SIGNIFICANT CHANGE UP (ref 98–107)
CO2 SERPL-SCNC: 21 MMOL/L — LOW (ref 22–31)
CREAT SERPL-MCNC: 0.55 MG/DL — SIGNIFICANT CHANGE UP (ref 0.5–1.3)
EOSINOPHIL # BLD AUTO: 0.06 K/UL — SIGNIFICANT CHANGE UP (ref 0–0.5)
EOSINOPHIL NFR BLD AUTO: 0.9 % — SIGNIFICANT CHANGE UP (ref 0–6)
GLUCOSE SERPL-MCNC: 75 MG/DL — SIGNIFICANT CHANGE UP (ref 70–99)
HCG SERPL-ACNC: 5982 MIU/ML — SIGNIFICANT CHANGE UP
HCT VFR BLD CALC: 32.1 % — LOW (ref 34.5–45)
HGB BLD-MCNC: 11.4 G/DL — LOW (ref 11.5–15.5)
IMM GRANULOCYTES # BLD AUTO: 0.04 # — SIGNIFICANT CHANGE UP
IMM GRANULOCYTES NFR BLD AUTO: 0.6 % — SIGNIFICANT CHANGE UP (ref 0–1.5)
INR BLD: 1.09 — SIGNIFICANT CHANGE UP (ref 0.88–1.17)
LYMPHOCYTES # BLD AUTO: 0.79 K/UL — LOW (ref 1–3.3)
LYMPHOCYTES # BLD AUTO: 11.3 % — LOW (ref 13–44)
MCHC RBC-ENTMCNC: 26.6 PG — LOW (ref 27–34)
MCHC RBC-ENTMCNC: 35.5 % — SIGNIFICANT CHANGE UP (ref 32–36)
MCV RBC AUTO: 75 FL — LOW (ref 80–100)
MONOCYTES # BLD AUTO: 0.85 K/UL — SIGNIFICANT CHANGE UP (ref 0–0.9)
MONOCYTES NFR BLD AUTO: 12.1 % — SIGNIFICANT CHANGE UP (ref 2–14)
NEUTROPHILS # BLD AUTO: 5.24 K/UL — SIGNIFICANT CHANGE UP (ref 1.8–7.4)
NEUTROPHILS NFR BLD AUTO: 74.7 % — SIGNIFICANT CHANGE UP (ref 43–77)
NRBC # FLD: 0 — SIGNIFICANT CHANGE UP
PLATELET # BLD AUTO: 250 K/UL — SIGNIFICANT CHANGE UP (ref 150–400)
PMV BLD: 11.1 FL — SIGNIFICANT CHANGE UP (ref 7–13)
POTASSIUM SERPL-MCNC: 3.7 MMOL/L — SIGNIFICANT CHANGE UP (ref 3.5–5.3)
POTASSIUM SERPL-SCNC: 3.7 MMOL/L — SIGNIFICANT CHANGE UP (ref 3.5–5.3)
PROT SERPL-MCNC: 6.8 G/DL — SIGNIFICANT CHANGE UP (ref 6–8.3)
PROTHROM AB SERPL-ACNC: 12.1 SEC — SIGNIFICANT CHANGE UP (ref 9.8–13.1)
RBC # BLD: 4.28 M/UL — SIGNIFICANT CHANGE UP (ref 3.8–5.2)
RBC # FLD: 15 % — HIGH (ref 10.3–14.5)
RH IG SCN BLD-IMP: POSITIVE — SIGNIFICANT CHANGE UP
SODIUM SERPL-SCNC: 135 MMOL/L — SIGNIFICANT CHANGE UP (ref 135–145)
WBC # BLD: 7.01 K/UL — SIGNIFICANT CHANGE UP (ref 3.8–10.5)
WBC # FLD AUTO: 7.01 K/UL — SIGNIFICANT CHANGE UP (ref 3.8–10.5)

## 2018-12-04 PROCEDURE — 72131 CT LUMBAR SPINE W/O DYE: CPT

## 2018-12-04 PROCEDURE — 99284 EMERGENCY DEPT VISIT MOD MDM: CPT

## 2018-12-04 PROCEDURE — 72125 CT NECK SPINE W/O DYE: CPT | Mod: 26

## 2018-12-04 PROCEDURE — 99291 CRITICAL CARE FIRST HOUR: CPT

## 2018-12-04 PROCEDURE — 72125 CT NECK SPINE W/O DYE: CPT

## 2018-12-04 PROCEDURE — 72128 CT CHEST SPINE W/O DYE: CPT | Mod: 26

## 2018-12-04 PROCEDURE — 72131 CT LUMBAR SPINE W/O DYE: CPT | Mod: 26

## 2018-12-04 PROCEDURE — 72128 CT CHEST SPINE W/O DYE: CPT

## 2018-12-04 PROCEDURE — 99285 EMERGENCY DEPT VISIT HI MDM: CPT | Mod: 25
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
## 2018-12-04 NOTE — ED PROVIDER NOTE - PROGRESS NOTE DETAILS
Patient could not tolerate MRI given claustrophobia. Refusing medication to assist with MRI. Opting for CT scan. Patient counseled extensively on risks and benefits. Signed consent. CT cervical/thoracic/lumbar spine being completed now. With follow up read. If patient is stable, okay for transfer to OB for monitoring.

## 2018-12-04 NOTE — ED PROVIDER NOTE - ENMT NEGATIVE STATEMENT, MLM
Ears: no ear pain and no hearing problems.Nose: no nasal congestion and no nasal drainage.Mouth/Throat: no dysphagia, no hoarseness and no throat pain.Neck: +PAIN, +STIFFNESS, no swollen glands.

## 2018-12-04 NOTE — ED ADULT NURSE REASSESSMENT NOTE - NS ED NURSE REASSESS COMMENT FT1
Patient refusing MRI due to claustrophobia- wants CT scan instead despite radiation risks. Patient needs to be on fetal monitoring- OB called at [phone].
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
## 2018-12-04 NOTE — ED ADULT NURSE REASSESSMENT NOTE - NS ED NURSE REASSESS COMMENT FT1
Facilitator RN:  pt requesting to get out of stretcher to use bathroom prior to transfer.  Per MD Culver, pt can only use bedpan.  Pt refusing to use bedpan, states will wait to urinate for when she arrives at [facility].  MD Culver aware.

## 2018-12-04 NOTE — ED PROVIDER NOTE - NEUROLOGICAL, MLM
Alert and oriented, RLE weakness with flexion and extension of ankle, pain cannot flex R hip 2/2 pain, no sensory deficits, upper extremity motor function intact bilaterally, L shoulder TTP, R neck TTP, no cervical midline tenderness
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
## 2018-12-04 NOTE — CONSULT NOTE ADULT - ATTENDING COMMENTS
I saw and examined the patient and I agree with the above note.  Due to pregnancy, radiation should be limited if used at all.  If residual pain or new neuro deficits develop, she knows to return the hospital.     Matthew Anderson MD  Acute and Critical Care Surgery
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
## 2018-12-04 NOTE — ED ADULT NURSE REASSESSMENT NOTE - NS ED NURSE REASSESS COMMENT FT1
Pt is well-appearing, breathing unlabored on room air with family at bedside. OB currently at bedside for assessment. Safety and comfort maintained, call bell within reach.
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
## 2018-12-04 NOTE — ED ADULT NURSE NOTE - NSIMPLEMENTINTERV_GEN_ALL_ED
Implemented All Universal Safety Interventions:  [place] to call system. Call bell, personal items and telephone within reach. Instruct patient to call for assistance. Room bathroom lighting operational. Non-slip footwear when patient is off stretcher. Physically safe environment: no spills, clutter or unnecessary equipment. Stretcher in lowest position, wheels locked, appropriate side rails in place.

## 2018-12-04 NOTE — ED PROVIDER NOTE - NS ED ROS FT
CONST: no fevers, no chills  EYES: no pain, no vision changes  ENT: no sore throat, no ear pain, no change in hearing  CV: no chest pain, no leg swelling  RESP: no shortness of breath, no cough  ABD: no abdominal pain, no nausea, no vomiting, no diarrhea  : no dysuria, no flank pain, no hematuria  MSK: +back pain, no extremity pain, +neck pain  NEURO: no headache or additional neurologic complaints  HEME: no easy bleeding  SKIN:  no rash
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
## 2018-12-04 NOTE — ED ADULT NURSE REASSESSMENT NOTE - NS ED NURSE REASSESS COMMENT FT1
Received report from ED RN Masoud; patient A&Ox3, afebrile- remains in c-collar, patient c/o nasal congestion; Dr Reese aware. VSS, 18 g IV in L AC patent and site WNL. OB at the bedside. Patient pending trama eval.
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
## 2018-12-04 NOTE — ED PROVIDER NOTE - PHYSICAL EXAMINATION
Vale Culver, DO.:   A -airway intact, no stridor or drooling  B- symmetric breath sounds  C- well perfused extremities, no external bleeding  GEN - NAD; well appearing; A+Ox3   HEAD - NC/AT     EYES - EOMI, PERRLA  ENT -  Mucous membranes moist ,no facial bone tenderness, normal mandibular alignment  NECK - +lower midline C spine tenderness  PULM - CTA b/l,  symmetric breath sounds, no chest wall tenderness  COR -  RRR, S1 S2, no murmurs  ABD -  Gravid, ND, NT, soft, no guarding, no rebound, no masses    BACK - no CVA tenderness, +tender lumbar spine, no contusions     EXTREMITIES - FROM all extremities, no deformity   SKIN - no rash or bruising      NEUROLOGIC - alert, sensation nl, motor 5/5 RUE/LUE/RLE/LLE

## 2018-12-05 ENCOUNTER — INPATIENT (INPATIENT)
Facility: HOSPITAL | Age: 23
LOS: 0 days | Discharge: HOME | DRG: 833 | End: 2018-12-06
Attending: OBSTETRICS & GYNECOLOGY | Admitting: OBSTETRICS & GYNECOLOGY
Payer: COMMERCIAL

## 2018-12-05 ENCOUNTER — APPOINTMENT (OUTPATIENT)
Dept: ANTEPARTUM | Facility: CLINIC | Age: 23
End: 2018-12-05
Payer: MEDICAID

## 2018-12-05 ENCOUNTER — ASOB RESULT (OUTPATIENT)
Age: 23
End: 2018-12-05

## 2018-12-05 VITALS — WEIGHT: 189.6 LBS | HEIGHT: 64 IN

## 2018-12-05 DIAGNOSIS — Z3A.00 WEEKS OF GESTATION OF PREGNANCY NOT SPECIFIED: ICD-10-CM

## 2018-12-05 DIAGNOSIS — Z98.890 OTHER SPECIFIED POSTPROCEDURAL STATES: Chronic | ICD-10-CM

## 2018-12-05 DIAGNOSIS — O26.899 OTHER SPECIFIED PREGNANCY RELATED CONDITIONS, UNSPECIFIED TRIMESTER: ICD-10-CM

## 2018-12-05 DIAGNOSIS — Z34.80 ENCOUNTER FOR SUPERVISION OF OTHER NORMAL PREGNANCY, UNSPECIFIED TRIMESTER: ICD-10-CM

## 2018-12-05 LAB
BLD GP AB SCN SERPL QL: NEGATIVE — SIGNIFICANT CHANGE UP
HCT VFR BLD CALC: 34.3 % — LOW (ref 34.5–45)
HGB BLD-MCNC: 11.8 G/DL — SIGNIFICANT CHANGE UP (ref 11.5–15.5)
MCHC RBC-ENTMCNC: 26.7 PG — LOW (ref 27–34)
MCHC RBC-ENTMCNC: 34.5 GM/DL — SIGNIFICANT CHANGE UP (ref 32–36)
MCV RBC AUTO: 77.3 FL — LOW (ref 80–100)
PLATELET # BLD AUTO: 221 K/UL — SIGNIFICANT CHANGE UP (ref 150–400)
RBC # BLD: 4.44 M/UL — SIGNIFICANT CHANGE UP (ref 3.8–5.2)
RBC # FLD: 15.6 % — HIGH (ref 10.3–14.5)
RH IG SCN BLD-IMP: POSITIVE — SIGNIFICANT CHANGE UP
T PALLIDUM AB TITR SER: NEGATIVE — SIGNIFICANT CHANGE UP
WBC # BLD: 7.4 K/UL — SIGNIFICANT CHANGE UP (ref 3.8–10.5)
WBC # FLD AUTO: 7.4 K/UL — SIGNIFICANT CHANGE UP (ref 3.8–10.5)

## 2018-12-05 PROCEDURE — 76817 TRANSVAGINAL US OBSTETRIC: CPT | Mod: 26

## 2018-12-05 PROCEDURE — 76805 OB US >/= 14 WKS SNGL FETUS: CPT | Mod: 26

## 2018-12-05 RX ORDER — ACETAMINOPHEN 500 MG
975 TABLET ORAL ONCE
Qty: 0 | Refills: 0 | Status: COMPLETED | OUTPATIENT
Start: 2018-12-05 | End: 2018-12-05

## 2018-12-05 RX ORDER — PROGESTERONE 200 MG/1
200 CAPSULE, LIQUID FILLED ORAL DAILY
Qty: 0 | Refills: 0 | Status: DISCONTINUED | OUTPATIENT
Start: 2018-12-05 | End: 2018-12-06

## 2018-12-05 RX ORDER — INDOMETHACIN 50 MG
25 CAPSULE ORAL EVERY 6 HOURS
Qty: 0 | Refills: 0 | Status: DISCONTINUED | OUTPATIENT
Start: 2018-12-06 | End: 2018-12-06

## 2018-12-05 RX ORDER — FOLIC ACID 0.8 MG
1 TABLET ORAL DAILY
Qty: 0 | Refills: 0 | Status: DISCONTINUED | OUTPATIENT
Start: 2018-12-05 | End: 2018-12-06

## 2018-12-05 RX ORDER — PROTAMINE SULFATE 10 MG/ML
50 AMPUL (ML) INTRAVENOUS ONCE
Qty: 0 | Refills: 0 | Status: DISCONTINUED | OUTPATIENT
Start: 2018-12-05 | End: 2018-12-05

## 2018-12-05 RX ORDER — INDOMETHACIN 50 MG
50 CAPSULE ORAL ONCE
Qty: 0 | Refills: 0 | Status: COMPLETED | OUTPATIENT
Start: 2018-12-05 | End: 2018-12-05

## 2018-12-05 RX ADMIN — Medication 12 MILLIGRAM(S): at 15:40

## 2018-12-05 RX ADMIN — Medication 975 MILLIGRAM(S): at 04:14

## 2018-12-05 RX ADMIN — PROGESTERONE 200 MILLIGRAM(S): 200 CAPSULE, LIQUID FILLED ORAL at 23:30

## 2018-12-05 RX ADMIN — Medication 50 MILLIGRAM(S): at 20:02

## 2018-12-06 ENCOUNTER — TRANSCRIPTION ENCOUNTER (OUTPATIENT)
Age: 23
End: 2018-12-06

## 2018-12-06 VITALS
RESPIRATION RATE: 18 BRPM | DIASTOLIC BLOOD PRESSURE: 78 MMHG | SYSTOLIC BLOOD PRESSURE: 126 MMHG | HEART RATE: 96 BPM | TEMPERATURE: 98 F

## 2018-12-06 LAB — TSH SERPL-MCNC: 2.02 UIU/ML — SIGNIFICANT CHANGE UP (ref 0.27–4.2)

## 2018-12-06 PROCEDURE — 99238 HOSP IP/OBS DSCHRG MGMT 30/<: CPT

## 2018-12-06 RX ORDER — PROGESTERONE 200 MG/1
1 CAPSULE, LIQUID FILLED ORAL
Qty: 30 | Refills: 2 | OUTPATIENT
Start: 2018-12-06 | End: 2019-03-05

## 2018-12-06 RX ORDER — SODIUM CHLORIDE 0.65 %
1 AEROSOL, SPRAY (ML) NASAL
Qty: 0 | Refills: 0 | Status: DISCONTINUED | OUTPATIENT
Start: 2018-12-06 | End: 2018-12-06

## 2018-12-06 RX ADMIN — Medication 1 TABLET(S): at 12:27

## 2018-12-06 RX ADMIN — Medication 1 SPRAY(S): at 12:28

## 2018-12-06 RX ADMIN — Medication 1 MILLIGRAM(S): at 12:27

## 2018-12-06 RX ADMIN — Medication 12 MILLIGRAM(S): at 14:57

## 2018-12-06 RX ADMIN — Medication 25 MILLIGRAM(S): at 08:26

## 2018-12-06 RX ADMIN — Medication 25 MILLIGRAM(S): at 02:09

## 2018-12-06 RX ADMIN — Medication 1 SPRAY(S): at 09:58

## 2018-12-06 RX ADMIN — Medication 25 MILLIGRAM(S): at 14:02

## 2018-12-06 NOTE — DISCHARGE NOTE ANTEPARTUM - MEDICATION SUMMARY - MEDICATIONS TO TAKE
I will START or STAY ON the medications listed below when I get home from the hospital:    Prenatal 1 oral capsule  -- 1 cap(s) by mouth once a day  -- Indication: For Supplement    progesterone 200 mg vaginal suppository  -- 1 suppository(ies) intravaginally once a day (at bedtime)   -- For vaginal use.  Keep in refrigerator.  Do not freeze.    -- Indication: For Short cervix

## 2018-12-06 NOTE — DISCHARGE NOTE ANTEPARTUM - INSTRUCTIONS
Follow up with MD as directed Call if any increased pain, fever, vaginal bleeding, leakage of fluid, and cramping and contractions.

## 2018-12-06 NOTE — DISCHARGE NOTE ANTEPARTUM - CARE PLAN
Principal Discharge DX:	Cervical shortening affecting pregnancy in second trimester  Goal:	Continued pregnancy  Assessment and plan of treatment:	Please follow-up in high risk clinic on Monday, 12/10 at 9:20 am.  Please use vaginal progesterone daily at bedtime. Principal Discharge DX:	Cervical shortening affecting pregnancy in second trimester  Goal:	Continued pregnancy  Assessment and plan of treatment:	Please follow-up in high risk clinic on Monday, 12/10 at 9:20 am.  Please use vaginal progesterone daily at bedtime.  Call the clinic or go to the hospital for frequent, strong contractions, vaginal bleeding, if your water breaks, or if you feel your baby move less.

## 2018-12-06 NOTE — DISCHARGE NOTE ANTEPARTUM - PROVIDER TOKENS
FREE:[[facility]PHONE:[phone]],FAX:[(   )    -]]

## 2018-12-06 NOTE — DISCHARGE NOTE ANTEPARTUM - PLAN OF CARE
Continued pregnancy Please follow-up in high risk clinic on Monday, 12/10 at 9:20 am.  Please use vaginal progesterone daily at bedtime. Please follow-up in high risk clinic on Monday, 12/10 at 9:20 am.  Please use vaginal progesterone daily at bedtime.  Call the clinic or go to the hospital for frequent, strong contractions, vaginal bleeding, if your water breaks, or if you feel your baby move less.

## 2018-12-06 NOTE — DISCHARGE NOTE ANTEPARTUM - PATIENT PORTAL LINK FT
You can access the [facility], offered by [facility], by registering with the following website: [email or web address]

## 2018-12-06 NOTE — DISCHARGE NOTE ANTEPARTUM - CARE PROVIDER_API CALL
[facility],   Phone: [phone]  Fax: (   )    -
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
## 2018-12-10 ENCOUNTER — APPOINTMENT (OUTPATIENT)
Dept: OBGYN | Facility: HOSPITAL | Age: 23
End: 2018-12-10
Payer: MEDICAID

## 2018-12-10 ENCOUNTER — NON-APPOINTMENT (OUTPATIENT)
Age: 23
End: 2018-12-10

## 2018-12-10 ENCOUNTER — LABORATORY RESULT (OUTPATIENT)
Age: 23
End: 2018-12-10

## 2018-12-10 ENCOUNTER — OUTPATIENT (OUTPATIENT)
Dept: OUTPATIENT SERVICES | Facility: HOSPITAL | Age: 23
LOS: 1 days | End: 2018-12-10

## 2018-12-10 VITALS — HEART RATE: 104 BPM | WEIGHT: 195 LBS | DIASTOLIC BLOOD PRESSURE: 58 MMHG | SYSTOLIC BLOOD PRESSURE: 130 MMHG

## 2018-12-10 DIAGNOSIS — Z98.890 OTHER SPECIFIED POSTPROCEDURAL STATES: Chronic | ICD-10-CM

## 2018-12-10 LAB
BASOPHILS # BLD AUTO: 0.03 K/UL — SIGNIFICANT CHANGE UP (ref 0–0.2)
BASOPHILS NFR BLD AUTO: 0.3 % — SIGNIFICANT CHANGE UP (ref 0–2)
EOSINOPHIL # BLD AUTO: 0.12 K/UL — SIGNIFICANT CHANGE UP (ref 0–0.5)
EOSINOPHIL NFR BLD AUTO: 1.1 % — SIGNIFICANT CHANGE UP (ref 0–6)
GLUCOSE PRE/P GLC SERPL-SCNC: 171 — HIGH (ref 65–115)
HCT VFR BLD CALC: 33.1 % — LOW (ref 34.5–45)
HGB BLD-MCNC: 11.7 G/DL — SIGNIFICANT CHANGE UP (ref 11.5–15.5)
IMM GRANULOCYTES # BLD AUTO: 0.15 # — SIGNIFICANT CHANGE UP
IMM GRANULOCYTES NFR BLD AUTO: 1.4 % — SIGNIFICANT CHANGE UP (ref 0–1.5)
LYMPHOCYTES # BLD AUTO: 18.5 % — SIGNIFICANT CHANGE UP (ref 13–44)
LYMPHOCYTES # BLD AUTO: 2.02 K/UL — SIGNIFICANT CHANGE UP (ref 1–3.3)
MCHC RBC-ENTMCNC: 26.7 PG — LOW (ref 27–34)
MCHC RBC-ENTMCNC: 35.3 % — SIGNIFICANT CHANGE UP (ref 32–36)
MCV RBC AUTO: 75.6 FL — LOW (ref 80–100)
MONOCYTES # BLD AUTO: 0.86 K/UL — SIGNIFICANT CHANGE UP (ref 0–0.9)
MONOCYTES NFR BLD AUTO: 7.9 % — SIGNIFICANT CHANGE UP (ref 2–14)
NEUTROPHILS # BLD AUTO: 7.75 K/UL — HIGH (ref 1.8–7.4)
NEUTROPHILS NFR BLD AUTO: 70.8 % — SIGNIFICANT CHANGE UP (ref 43–77)
NRBC # FLD: 0 — SIGNIFICANT CHANGE UP
PLATELET # BLD AUTO: 362 K/UL — SIGNIFICANT CHANGE UP (ref 150–400)
PMV BLD: 12.4 FL — SIGNIFICANT CHANGE UP (ref 7–13)
RBC # BLD: 4.38 M/UL — SIGNIFICANT CHANGE UP (ref 3.8–5.2)
RBC # FLD: 15.2 % — HIGH (ref 10.3–14.5)
T PALLIDUM AB TITR SER: NEGATIVE — SIGNIFICANT CHANGE UP
T4 FREE SERPL-MCNC: 1.42 NG/DL — SIGNIFICANT CHANGE UP (ref 0.9–1.8)
TSH SERPL-MCNC: 3.53 UIU/ML — SIGNIFICANT CHANGE UP (ref 0.27–4.2)
WBC # BLD: 10.93 K/UL — HIGH (ref 3.8–10.5)
WBC # FLD AUTO: 10.93 K/UL — HIGH (ref 3.8–10.5)

## 2018-12-10 PROCEDURE — 81003 URINALYSIS AUTO W/O SCOPE: CPT | Mod: NC,GC,QW

## 2018-12-10 PROCEDURE — 36415 COLL VENOUS BLD VENIPUNCTURE: CPT | Mod: NC,GC

## 2018-12-10 PROCEDURE — 99214 OFFICE O/P EST MOD 30 MIN: CPT | Mod: 25,GC,TH

## 2018-12-11 DIAGNOSIS — O09.90 SUPERVISION OF HIGH RISK PREGNANCY, UNSPECIFIED, UNSPECIFIED TRIMESTER: ICD-10-CM

## 2018-12-11 DIAGNOSIS — O26.879 CERVICAL SHORTENING, UNSPECIFIED TRIMESTER: ICD-10-CM

## 2018-12-11 DIAGNOSIS — R06.02 SHORTNESS OF BREATH: ICD-10-CM

## 2018-12-11 DIAGNOSIS — N76.0 ACUTE VAGINITIS: ICD-10-CM

## 2018-12-13 LAB
CANDIDA AB TITR SER: DETECTED — SIGNIFICANT CHANGE UP
G VAGINALIS DNA SPEC QL NAA+PROBE: NOT DETECTED — SIGNIFICANT CHANGE UP
T VAGINALIS SPEC QL WET PREP: NOT DETECTED — SIGNIFICANT CHANGE UP

## 2018-12-17 ENCOUNTER — APPOINTMENT (OUTPATIENT)
Dept: OBGYN | Facility: HOSPITAL | Age: 23
End: 2018-12-17

## 2018-12-17 ENCOUNTER — LABORATORY RESULT (OUTPATIENT)
Age: 23
End: 2018-12-17

## 2018-12-17 ENCOUNTER — OUTPATIENT (OUTPATIENT)
Dept: OUTPATIENT SERVICES | Facility: HOSPITAL | Age: 23
LOS: 1 days | End: 2018-12-17

## 2018-12-17 DIAGNOSIS — Z34.90 ENCOUNTER FOR SUPERVISION OF NORMAL PREGNANCY, UNSPECIFIED, UNSPECIFIED TRIMESTER: ICD-10-CM

## 2018-12-17 DIAGNOSIS — R73.02 IMPAIRED GLUCOSE TOLERANCE (ORAL): ICD-10-CM

## 2018-12-17 DIAGNOSIS — Z98.890 OTHER SPECIFIED POSTPROCEDURAL STATES: Chronic | ICD-10-CM

## 2018-12-17 LAB
GLUCOSE 1H P CHAL SERPL-SCNC: 155 MG/DL — SIGNIFICANT CHANGE UP (ref 120–170)
GLUCOSE 2H P GLC SERPL-MCNC: 77 MG/DL — SIGNIFICANT CHANGE UP (ref 70–120)
GLUCOSE 3H P CHAL SERPL-SCNC: 133 MG/DL — HIGH (ref 70–115)
GLUCOSE P FAST SERPL-MCNC: 160 MG/DL — HIGH (ref 70–108)

## 2018-12-18 ENCOUNTER — APPOINTMENT (OUTPATIENT)
Dept: OBGYN | Facility: HOSPITAL | Age: 23
End: 2018-12-18

## 2018-12-20 ENCOUNTER — OUTPATIENT (OUTPATIENT)
Dept: OUTPATIENT SERVICES | Facility: HOSPITAL | Age: 23
LOS: 1 days | End: 2018-12-20
Payer: MEDICAID

## 2018-12-20 ENCOUNTER — APPOINTMENT (OUTPATIENT)
Dept: CV DIAGNOSITCS | Facility: HOSPITAL | Age: 23
End: 2018-12-20

## 2018-12-20 ENCOUNTER — APPOINTMENT (OUTPATIENT)
Dept: CARDIOLOGY | Facility: CLINIC | Age: 23
End: 2018-12-20
Payer: MEDICAID

## 2018-12-20 VITALS
BODY MASS INDEX: 32.78 KG/M2 | SYSTOLIC BLOOD PRESSURE: 144 MMHG | WEIGHT: 192 LBS | DIASTOLIC BLOOD PRESSURE: 85 MMHG | OXYGEN SATURATION: 99 % | HEIGHT: 64 IN | HEART RATE: 118 BPM

## 2018-12-20 DIAGNOSIS — R06.02 SHORTNESS OF BREATH: ICD-10-CM

## 2018-12-20 DIAGNOSIS — R07.9 CHEST PAIN, UNSPECIFIED: ICD-10-CM

## 2018-12-20 DIAGNOSIS — Z98.890 OTHER SPECIFIED POSTPROCEDURAL STATES: Chronic | ICD-10-CM

## 2018-12-20 PROCEDURE — 93000 ELECTROCARDIOGRAM COMPLETE: CPT

## 2018-12-20 PROCEDURE — 99205 OFFICE O/P NEW HI 60 MIN: CPT

## 2018-12-20 PROCEDURE — 93306 TTE W/DOPPLER COMPLETE: CPT | Mod: 26

## 2018-12-24 ENCOUNTER — APPOINTMENT (OUTPATIENT)
Dept: OBGYN | Facility: HOSPITAL | Age: 23
End: 2018-12-24

## 2018-12-27 PROBLEM — R06.02 SHORTNESS OF BREATH: Status: ACTIVE | Noted: 2018-12-10

## 2018-12-27 NOTE — PHYSICAL EXAM
[General Appearance - Well Developed] : well developed [Normal Appearance] : normal appearance [Well Groomed] : well groomed [General Appearance - Well Nourished] : well nourished [No Deformities] : no deformities [General Appearance - In No Acute Distress] : no acute distress [Normal Conjunctiva] : the conjunctiva exhibited no abnormalities [Eyelids - No Xanthelasma] : the eyelids demonstrated no xanthelasmas [Normal Oral Mucosa] : normal oral mucosa [No Oral Pallor] : no oral pallor [No Oral Cyanosis] : no oral cyanosis [Normal Jugular Venous A Waves Present] : normal jugular venous A waves present [Normal Jugular Venous V Waves Present] : normal jugular venous V waves present [No Jugular Venous Vazquez A Waves] : no jugular venous vazquez A waves [Heart Rate And Rhythm] : heart rate and rhythm were normal [Heart Sounds] : normal S1 and S2 [Murmurs] : no murmurs present [Respiration, Rhythm And Depth] : normal respiratory rhythm and effort [Exaggerated Use Of Accessory Muscles For Inspiration] : no accessory muscle use [Auscultation Breath Sounds / Voice Sounds] : lungs were clear to auscultation bilaterally [Abdomen Soft] : soft [Abdomen Tenderness] : non-tender [Abdomen Mass (___ Cm)] : no abdominal mass palpated [Abnormal Walk] : normal gait [Gait - Sufficient For Exercise Testing] : the gait was sufficient for exercise testing [Nail Clubbing] : no clubbing of the fingernails [Cyanosis, Localized] : no localized cyanosis [Petechial Hemorrhages (___cm)] : no petechial hemorrhages [Skin Color & Pigmentation] : normal skin color and pigmentation [] : no rash [No Venous Stasis] : no venous stasis [Skin Lesions] : no skin lesions [No Skin Ulcers] : no skin ulcer [No Xanthoma] : no  xanthoma was observed [Oriented To Time, Place, And Person] : oriented to person, place, and time [Affect] : the affect was normal [Mood] : the mood was normal [No Anxiety] : not feeling anxious

## 2018-12-27 NOTE — REVIEW OF SYSTEMS
[Shortness Of Breath] : shortness of breath [Dyspnea on exertion] : not dyspnea during exertion [Chest  Pressure] : no chest pressure [Chest Pain] : no chest pain [Lower Ext Edema] : no extremity edema [Leg Claudication] : no intermittent leg claudication [Palpitations] : palpitations [Negative] : Heme/Lymph

## 2018-12-27 NOTE — REASON FOR VISIT
[FreeTextEntry1] : Ms. George is currently pregnant with due date in March 2019 who presents with complaints of SOB and palpitations related to sinus tachycardia.  She denies having exertional limitations.  O2 sat at RA is 99% and she denies having exertional symptoms, presyncope/syncope, chest pain.  No previous personal or FH history of VTE.\par \par 12-lead ECG demonstrates sinus tachycardia.  Physical examination was unremarkable.\par Echocardiogram performed today as part of her initial evaluation demonstrated normal biventricular systolic function LVEF 60-65%.\par \par At this time, I reassured patient.  She was advised to f/u in 1 month for routine evaluation.\par

## 2019-01-07 ENCOUNTER — APPOINTMENT (OUTPATIENT)
Dept: OBGYN | Facility: HOSPITAL | Age: 24
End: 2019-01-07

## 2019-01-14 ENCOUNTER — NON-APPOINTMENT (OUTPATIENT)
Age: 24
End: 2019-01-14

## 2019-01-14 ENCOUNTER — APPOINTMENT (OUTPATIENT)
Dept: OBGYN | Facility: HOSPITAL | Age: 24
End: 2019-01-14
Payer: MEDICAID

## 2019-01-14 ENCOUNTER — OUTPATIENT (OUTPATIENT)
Dept: OUTPATIENT SERVICES | Facility: HOSPITAL | Age: 24
LOS: 1 days | End: 2019-01-14
Payer: MEDICAID

## 2019-01-14 VITALS
HEIGHT: 64 IN | HEART RATE: 111 BPM | SYSTOLIC BLOOD PRESSURE: 122 MMHG | DIASTOLIC BLOOD PRESSURE: 58 MMHG | WEIGHT: 195 LBS | BODY MASS INDEX: 33.29 KG/M2

## 2019-01-14 DIAGNOSIS — Z34.83 ENCOUNTER FOR SUPERVISION OF OTHER NORMAL PREGNANCY, THIRD TRIMESTER: ICD-10-CM

## 2019-01-14 DIAGNOSIS — Z98.890 OTHER SPECIFIED POSTPROCEDURAL STATES: Chronic | ICD-10-CM

## 2019-01-14 PROCEDURE — 81003 URINALYSIS AUTO W/O SCOPE: CPT | Mod: NC,GC,QW

## 2019-01-14 PROCEDURE — 99213 OFFICE O/P EST LOW 20 MIN: CPT | Mod: 25,GC,TH

## 2019-01-14 RX ORDER — MICONAZOLE NITRATE 4 %
4 CREAM WITH PREFILLED APPLICATOR VAGINAL
Qty: 1 | Refills: 0 | Status: COMPLETED | COMMUNITY
Start: 2018-12-10 | End: 2018-12-17

## 2019-01-14 RX ORDER — METRONIDAZOLE 500 MG/1
500 TABLET ORAL TWICE DAILY
Qty: 14 | Refills: 0 | Status: COMPLETED | COMMUNITY
Start: 2018-10-23 | End: 2018-10-30

## 2019-01-14 RX ORDER — TERCONAZOLE 4 MG/G
0.4 CREAM VAGINAL
Qty: 1 | Refills: 0 | Status: ACTIVE | COMMUNITY
Start: 2018-10-23

## 2019-01-15 ENCOUNTER — NON-APPOINTMENT (OUTPATIENT)
Age: 24
End: 2019-01-15

## 2019-01-17 ENCOUNTER — NON-APPOINTMENT (OUTPATIENT)
Age: 24
End: 2019-01-17

## 2019-01-29 ENCOUNTER — NON-APPOINTMENT (OUTPATIENT)
Age: 24
End: 2019-01-29

## 2019-01-29 ENCOUNTER — APPOINTMENT (OUTPATIENT)
Dept: ULTRASOUND IMAGING | Facility: HOSPITAL | Age: 24
End: 2019-01-29

## 2019-01-29 ENCOUNTER — OUTPATIENT (OUTPATIENT)
Dept: OUTPATIENT SERVICES | Facility: HOSPITAL | Age: 24
LOS: 1 days | End: 2019-01-29

## 2019-01-29 ENCOUNTER — APPOINTMENT (OUTPATIENT)
Dept: OBGYN | Facility: HOSPITAL | Age: 24
End: 2019-01-29

## 2019-01-29 VITALS — WEIGHT: 197.98 LBS | SYSTOLIC BLOOD PRESSURE: 122 MMHG | DIASTOLIC BLOOD PRESSURE: 83 MMHG | HEART RATE: 92 BPM

## 2019-01-29 DIAGNOSIS — Z98.890 OTHER SPECIFIED POSTPROCEDURAL STATES: Chronic | ICD-10-CM

## 2019-01-29 DIAGNOSIS — D58.2 OTHER HEMOGLOBINOPATHIES: ICD-10-CM

## 2019-01-29 PROCEDURE — 76805 OB US >/= 14 WKS SNGL FETUS: CPT | Mod: 26

## 2019-01-30 DIAGNOSIS — D58.2 OTHER HEMOGLOBINOPATHIES: ICD-10-CM

## 2019-01-30 DIAGNOSIS — O26.879 CERVICAL SHORTENING, UNSPECIFIED TRIMESTER: ICD-10-CM

## 2019-01-30 DIAGNOSIS — O09.90 SUPERVISION OF HIGH RISK PREGNANCY, UNSPECIFIED, UNSPECIFIED TRIMESTER: ICD-10-CM

## 2019-02-12 ENCOUNTER — APPOINTMENT (OUTPATIENT)
Dept: OBGYN | Facility: HOSPITAL | Age: 24
End: 2019-02-12

## 2019-02-17 ENCOUNTER — INPATIENT (INPATIENT)
Facility: HOSPITAL | Age: 24
LOS: 3 days | Discharge: HOME | End: 2019-02-21
Attending: OBSTETRICS & GYNECOLOGY | Admitting: OBSTETRICS & GYNECOLOGY

## 2019-02-17 VITALS — HEIGHT: 64 IN | WEIGHT: 191.8 LBS

## 2019-02-17 DIAGNOSIS — Z3A.00 WEEKS OF GESTATION OF PREGNANCY NOT SPECIFIED: ICD-10-CM

## 2019-02-17 DIAGNOSIS — O26.899 OTHER SPECIFIED PREGNANCY RELATED CONDITIONS, UNSPECIFIED TRIMESTER: ICD-10-CM

## 2019-02-17 DIAGNOSIS — Z98.890 OTHER SPECIFIED POSTPROCEDURAL STATES: Chronic | ICD-10-CM

## 2019-02-17 LAB
ALBUMIN SERPL ELPH-MCNC: 3.4 G/DL — SIGNIFICANT CHANGE UP (ref 3.3–5)
ALP SERPL-CCNC: 212 U/L — HIGH (ref 40–120)
ALT FLD-CCNC: 11 U/L — SIGNIFICANT CHANGE UP (ref 4–33)
ANION GAP SERPL CALC-SCNC: 13 MMO/L — SIGNIFICANT CHANGE UP (ref 7–14)
APPEARANCE UR: CLEAR — SIGNIFICANT CHANGE UP
APTT BLD: 25.4 SEC — LOW (ref 27.5–36.3)
AST SERPL-CCNC: 14 U/L — SIGNIFICANT CHANGE UP (ref 4–32)
BACTERIA # UR AUTO: SIGNIFICANT CHANGE UP
BASOPHILS # BLD AUTO: 0.03 K/UL — SIGNIFICANT CHANGE UP (ref 0–0.2)
BASOPHILS NFR BLD AUTO: 0.4 % — SIGNIFICANT CHANGE UP (ref 0–2)
BILIRUB SERPL-MCNC: < 0.2 MG/DL — LOW (ref 0.2–1.2)
BILIRUB UR-MCNC: NEGATIVE — SIGNIFICANT CHANGE UP
BLD GP AB SCN SERPL QL: NEGATIVE — SIGNIFICANT CHANGE UP
BLOOD UR QL VISUAL: NEGATIVE — SIGNIFICANT CHANGE UP
BUN SERPL-MCNC: 6 MG/DL — LOW (ref 7–23)
CALCIUM SERPL-MCNC: 8.9 MG/DL — SIGNIFICANT CHANGE UP (ref 8.4–10.5)
CHLORIDE SERPL-SCNC: 104 MMOL/L — SIGNIFICANT CHANGE UP (ref 98–107)
CO2 SERPL-SCNC: 20 MMOL/L — LOW (ref 22–31)
COLOR SPEC: YELLOW — SIGNIFICANT CHANGE UP
CREAT ?TM UR-MCNC: 107.3 MG/DL — SIGNIFICANT CHANGE UP
CREAT SERPL-MCNC: 0.53 MG/DL — SIGNIFICANT CHANGE UP (ref 0.5–1.3)
EOSINOPHIL # BLD AUTO: 0.14 K/UL — SIGNIFICANT CHANGE UP (ref 0–0.5)
EOSINOPHIL NFR BLD AUTO: 2.1 % — SIGNIFICANT CHANGE UP (ref 0–6)
EPI CELLS # UR: SIGNIFICANT CHANGE UP
FIBRINOGEN PPP-MCNC: 697.4 MG/DL — HIGH (ref 350–510)
GLUCOSE SERPL-MCNC: 98 MG/DL — SIGNIFICANT CHANGE UP (ref 70–99)
GLUCOSE UR-MCNC: NEGATIVE — SIGNIFICANT CHANGE UP
HCT VFR BLD CALC: 34.2 % — LOW (ref 34.5–45)
HGB BLD-MCNC: 11.2 G/DL — LOW (ref 11.5–15.5)
IMM GRANULOCYTES NFR BLD AUTO: 0.6 % — SIGNIFICANT CHANGE UP (ref 0–1.5)
INR BLD: 1.02 — SIGNIFICANT CHANGE UP (ref 0.88–1.17)
KETONES UR-MCNC: NEGATIVE — SIGNIFICANT CHANGE UP
LDH SERPL L TO P-CCNC: 174 U/L — SIGNIFICANT CHANGE UP (ref 135–225)
LEUKOCYTE ESTERASE UR-ACNC: HIGH
LYMPHOCYTES # BLD AUTO: 1.43 K/UL — SIGNIFICANT CHANGE UP (ref 1–3.3)
LYMPHOCYTES # BLD AUTO: 21.1 % — SIGNIFICANT CHANGE UP (ref 13–44)
MCHC RBC-ENTMCNC: 23.7 PG — LOW (ref 27–34)
MCHC RBC-ENTMCNC: 32.7 % — SIGNIFICANT CHANGE UP (ref 32–36)
MCV RBC AUTO: 72.5 FL — LOW (ref 80–100)
MONOCYTES # BLD AUTO: 0.84 K/UL — SIGNIFICANT CHANGE UP (ref 0–0.9)
MONOCYTES NFR BLD AUTO: 12.4 % — SIGNIFICANT CHANGE UP (ref 2–14)
MUCOUS THREADS # UR AUTO: SIGNIFICANT CHANGE UP
NEUTROPHILS # BLD AUTO: 4.31 K/UL — SIGNIFICANT CHANGE UP (ref 1.8–7.4)
NEUTROPHILS NFR BLD AUTO: 63.4 % — SIGNIFICANT CHANGE UP (ref 43–77)
NITRITE UR-MCNC: NEGATIVE — SIGNIFICANT CHANGE UP
NRBC # FLD: 0 K/UL — LOW (ref 25–125)
PH UR: 7 — SIGNIFICANT CHANGE UP (ref 5–8)
PLATELET # BLD AUTO: 298 K/UL — SIGNIFICANT CHANGE UP (ref 150–400)
PMV BLD: 11.9 FL — SIGNIFICANT CHANGE UP (ref 7–13)
POTASSIUM SERPL-MCNC: 3.9 MMOL/L — SIGNIFICANT CHANGE UP (ref 3.5–5.3)
POTASSIUM SERPL-SCNC: 3.9 MMOL/L — SIGNIFICANT CHANGE UP (ref 3.5–5.3)
PROT SERPL-MCNC: 6.3 G/DL — SIGNIFICANT CHANGE UP (ref 6–8.3)
PROT UR-MCNC: 10 — SIGNIFICANT CHANGE UP
PROT UR-MCNC: 16.6 MG/DL — SIGNIFICANT CHANGE UP
PROTHROM AB SERPL-ACNC: 11.3 SEC — SIGNIFICANT CHANGE UP (ref 9.8–13.1)
RBC # BLD: 4.72 M/UL — SIGNIFICANT CHANGE UP (ref 3.8–5.2)
RBC # FLD: 16.6 % — HIGH (ref 10.3–14.5)
RBC CASTS # UR COMP ASSIST: SIGNIFICANT CHANGE UP (ref 0–?)
RH IG SCN BLD-IMP: POSITIVE — SIGNIFICANT CHANGE UP
SODIUM SERPL-SCNC: 137 MMOL/L — SIGNIFICANT CHANGE UP (ref 135–145)
SP GR SPEC: 1.02 — SIGNIFICANT CHANGE UP (ref 1–1.04)
URATE SERPL-MCNC: 3.3 MG/DL — SIGNIFICANT CHANGE UP (ref 2.5–7)
UROBILINOGEN FLD QL: NORMAL — SIGNIFICANT CHANGE UP
WBC # BLD: 6.79 K/UL — SIGNIFICANT CHANGE UP (ref 3.8–10.5)
WBC # FLD AUTO: 6.79 K/UL — SIGNIFICANT CHANGE UP (ref 3.8–10.5)
WBC UR QL: HIGH (ref 0–?)

## 2019-02-17 RX ORDER — SODIUM CHLORIDE 9 MG/ML
1000 INJECTION, SOLUTION INTRAVENOUS ONCE
Qty: 0 | Refills: 0 | Status: COMPLETED | OUTPATIENT
Start: 2019-02-17 | End: 2019-02-18

## 2019-02-17 RX ORDER — VANCOMYCIN HCL 1 G
1000 VIAL (EA) INTRAVENOUS EVERY 12 HOURS
Qty: 0 | Refills: 0 | Status: DISCONTINUED | OUTPATIENT
Start: 2019-02-18 | End: 2019-02-19

## 2019-02-17 RX ORDER — VANCOMYCIN HCL 1 G
VIAL (EA) INTRAVENOUS
Qty: 0 | Refills: 0 | Status: DISCONTINUED | OUTPATIENT
Start: 2019-02-17 | End: 2019-02-19

## 2019-02-17 RX ORDER — OXYTOCIN 10 UNIT/ML
333.33 VIAL (ML) INJECTION
Qty: 20 | Refills: 0 | Status: DISCONTINUED | OUTPATIENT
Start: 2019-02-17 | End: 2019-02-20

## 2019-02-17 RX ORDER — VANCOMYCIN HCL 1 G
1000 VIAL (EA) INTRAVENOUS ONCE
Qty: 0 | Refills: 0 | Status: COMPLETED | OUTPATIENT
Start: 2019-02-17 | End: 2019-02-17

## 2019-02-17 RX ORDER — SODIUM CHLORIDE 9 MG/ML
1000 INJECTION, SOLUTION INTRAVENOUS
Qty: 0 | Refills: 0 | Status: DISCONTINUED | OUTPATIENT
Start: 2019-02-17 | End: 2019-02-20

## 2019-02-17 RX ADMIN — Medication 250 MILLIGRAM(S): at 20:19

## 2019-02-17 RX ADMIN — SODIUM CHLORIDE 125 MILLILITER(S): 9 INJECTION, SOLUTION INTRAVENOUS at 20:19

## 2019-02-18 LAB
ALBUMIN SERPL ELPH-MCNC: 2.4 G/DL — LOW (ref 3.3–5)
ALP SERPL-CCNC: 164 U/L — HIGH (ref 40–120)
ALT FLD-CCNC: 8 U/L — SIGNIFICANT CHANGE UP (ref 4–33)
ANION GAP SERPL CALC-SCNC: 14 MMO/L — SIGNIFICANT CHANGE UP (ref 7–14)
APTT BLD: 28.5 SEC — SIGNIFICANT CHANGE UP (ref 27.5–36.3)
AST SERPL-CCNC: 12 U/L — SIGNIFICANT CHANGE UP (ref 4–32)
BASOPHILS # BLD AUTO: 0.01 K/UL — SIGNIFICANT CHANGE UP (ref 0–0.2)
BASOPHILS NFR BLD AUTO: 0.2 % — SIGNIFICANT CHANGE UP (ref 0–2)
BILIRUB SERPL-MCNC: < 0.2 MG/DL — LOW (ref 0.2–1.2)
BUN SERPL-MCNC: 4 MG/DL — LOW (ref 7–23)
CALCIUM SERPL-MCNC: 8.3 MG/DL — LOW (ref 8.4–10.5)
CHLORIDE SERPL-SCNC: 106 MMOL/L — SIGNIFICANT CHANGE UP (ref 98–107)
CO2 SERPL-SCNC: 15 MMOL/L — LOW (ref 22–31)
CREAT SERPL-MCNC: 0.5 MG/DL — SIGNIFICANT CHANGE UP (ref 0.5–1.3)
EOSINOPHIL # BLD AUTO: 0.03 K/UL — SIGNIFICANT CHANGE UP (ref 0–0.5)
EOSINOPHIL NFR BLD AUTO: 0.5 % — SIGNIFICANT CHANGE UP (ref 0–6)
FIBRINOGEN PPP-MCNC: 617.8 MG/DL — HIGH (ref 350–510)
GLUCOSE SERPL-MCNC: 76 MG/DL — SIGNIFICANT CHANGE UP (ref 70–99)
HCT VFR BLD CALC: 19.3 % — CRITICAL LOW (ref 34.5–45)
HCT VFR BLD CALC: 35.6 % — SIGNIFICANT CHANGE UP (ref 34.5–45)
HGB BLD-MCNC: 11.5 G/DL — SIGNIFICANT CHANGE UP (ref 11.5–15.5)
HGB BLD-MCNC: 6.3 G/DL — CRITICAL LOW (ref 11.5–15.5)
IMM GRANULOCYTES NFR BLD AUTO: 0.5 % — SIGNIFICANT CHANGE UP (ref 0–1.5)
INR BLD: 1.12 — SIGNIFICANT CHANGE UP (ref 0.88–1.17)
LDH SERPL L TO P-CCNC: 144 U/L — SIGNIFICANT CHANGE UP (ref 135–225)
LYMPHOCYTES # BLD AUTO: 0.58 K/UL — LOW (ref 1–3.3)
LYMPHOCYTES # BLD AUTO: 10.2 % — LOW (ref 13–44)
MCHC RBC-ENTMCNC: 23.6 PG — LOW (ref 27–34)
MCHC RBC-ENTMCNC: 24.6 PG — LOW (ref 27–34)
MCHC RBC-ENTMCNC: 32.3 % — SIGNIFICANT CHANGE UP (ref 32–36)
MCHC RBC-ENTMCNC: 32.6 % — SIGNIFICANT CHANGE UP (ref 32–36)
MCV RBC AUTO: 73.1 FL — LOW (ref 80–100)
MCV RBC AUTO: 75.4 FL — LOW (ref 80–100)
MONOCYTES # BLD AUTO: 0.62 K/UL — SIGNIFICANT CHANGE UP (ref 0–0.9)
MONOCYTES NFR BLD AUTO: 10.9 % — SIGNIFICANT CHANGE UP (ref 2–14)
NEUTROPHILS # BLD AUTO: 4.4 K/UL — SIGNIFICANT CHANGE UP (ref 1.8–7.4)
NEUTROPHILS NFR BLD AUTO: 77.7 % — HIGH (ref 43–77)
NRBC # FLD: 0 K/UL — LOW (ref 25–125)
NRBC # FLD: 0.02 K/UL — LOW (ref 25–125)
PLATELET # BLD AUTO: 152 K/UL — SIGNIFICANT CHANGE UP (ref 150–400)
PLATELET # BLD AUTO: 278 K/UL — SIGNIFICANT CHANGE UP (ref 150–400)
PMV BLD: 11.9 FL — SIGNIFICANT CHANGE UP (ref 7–13)
PMV BLD: SIGNIFICANT CHANGE UP FL (ref 7–13)
POTASSIUM SERPL-MCNC: 4.2 MMOL/L — SIGNIFICANT CHANGE UP (ref 3.5–5.3)
POTASSIUM SERPL-SCNC: 4.2 MMOL/L — SIGNIFICANT CHANGE UP (ref 3.5–5.3)
PROT SERPL-MCNC: 4.9 G/DL — LOW (ref 6–8.3)
PROTHROM AB SERPL-ACNC: 12.5 SEC — SIGNIFICANT CHANGE UP (ref 9.8–13.1)
RBC # BLD: 2.56 M/UL — LOW (ref 3.8–5.2)
RBC # BLD: 4.87 M/UL — SIGNIFICANT CHANGE UP (ref 3.8–5.2)
RBC # FLD: 16.7 % — HIGH (ref 10.3–14.5)
RBC # FLD: 16.8 % — HIGH (ref 10.3–14.5)
SODIUM SERPL-SCNC: 135 MMOL/L — SIGNIFICANT CHANGE UP (ref 135–145)
URATE SERPL-MCNC: 3.2 MG/DL — SIGNIFICANT CHANGE UP (ref 2.5–7)
WBC # BLD: 10 K/UL — SIGNIFICANT CHANGE UP (ref 3.8–10.5)
WBC # BLD: 5.67 K/UL — SIGNIFICANT CHANGE UP (ref 3.8–10.5)
WBC # FLD AUTO: 10 K/UL — SIGNIFICANT CHANGE UP (ref 3.8–10.5)
WBC # FLD AUTO: 5.67 K/UL — SIGNIFICANT CHANGE UP (ref 3.8–10.5)

## 2019-02-18 RX ORDER — BUTORPHANOL TARTRATE 2 MG/ML
2 INJECTION, SOLUTION INTRAMUSCULAR; INTRAVENOUS ONCE
Qty: 0 | Refills: 0 | Status: DISCONTINUED | OUTPATIENT
Start: 2019-02-18 | End: 2019-02-18

## 2019-02-18 RX ORDER — SODIUM CHLORIDE 9 MG/ML
300 INJECTION INTRAMUSCULAR; INTRAVENOUS; SUBCUTANEOUS ONCE
Qty: 0 | Refills: 0 | Status: DISCONTINUED | OUTPATIENT
Start: 2019-02-18 | End: 2019-02-18

## 2019-02-18 RX ORDER — SODIUM CHLORIDE 9 MG/ML
1000 INJECTION INTRAMUSCULAR; INTRAVENOUS; SUBCUTANEOUS
Qty: 0 | Refills: 0 | Status: DISCONTINUED | OUTPATIENT
Start: 2019-02-18 | End: 2019-02-18

## 2019-02-18 RX ADMIN — Medication 250 MILLIGRAM(S): at 08:40

## 2019-02-18 RX ADMIN — BUTORPHANOL TARTRATE 2 MILLIGRAM(S): 2 INJECTION, SOLUTION INTRAMUSCULAR; INTRAVENOUS at 13:25

## 2019-02-18 RX ADMIN — SODIUM CHLORIDE 125 MILLILITER(S): 9 INJECTION, SOLUTION INTRAVENOUS at 08:40

## 2019-02-18 RX ADMIN — BUTORPHANOL TARTRATE 2 MILLIGRAM(S): 2 INJECTION, SOLUTION INTRAMUSCULAR; INTRAVENOUS at 14:00

## 2019-02-18 RX ADMIN — Medication 250 MILLIGRAM(S): at 20:45

## 2019-02-18 RX ADMIN — SODIUM CHLORIDE 2000 MILLILITER(S): 9 INJECTION, SOLUTION INTRAVENOUS at 16:00

## 2019-02-19 LAB — SPECIMEN SOURCE: SIGNIFICANT CHANGE UP

## 2019-02-19 RX ORDER — TETANUS TOXOID, REDUCED DIPHTHERIA TOXOID AND ACELLULAR PERTUSSIS VACCINE, ADSORBED 5; 2.5; 8; 8; 2.5 [IU]/.5ML; [IU]/.5ML; UG/.5ML; UG/.5ML; UG/.5ML
0.5 SUSPENSION INTRAMUSCULAR ONCE
Qty: 0 | Refills: 0 | Status: COMPLETED | OUTPATIENT
Start: 2019-02-21

## 2019-02-19 RX ORDER — DIBUCAINE 1 %
1 OINTMENT (GRAM) RECTAL EVERY 4 HOURS
Qty: 0 | Refills: 0 | Status: DISCONTINUED | OUTPATIENT
Start: 2019-02-20 | End: 2019-02-21

## 2019-02-19 RX ORDER — MAGNESIUM HYDROXIDE 400 MG/1
30 TABLET, CHEWABLE ORAL
Qty: 0 | Refills: 0 | Status: DISCONTINUED | OUTPATIENT
Start: 2019-02-20 | End: 2019-02-21

## 2019-02-19 RX ORDER — HYDROCORTISONE 1 %
1 OINTMENT (GRAM) TOPICAL EVERY 4 HOURS
Qty: 0 | Refills: 0 | Status: DISCONTINUED | OUTPATIENT
Start: 2019-02-20 | End: 2019-02-21

## 2019-02-19 RX ORDER — PRAMOXINE HYDROCHLORIDE 150 MG/15G
1 AEROSOL, FOAM RECTAL EVERY 4 HOURS
Qty: 0 | Refills: 0 | Status: DISCONTINUED | OUTPATIENT
Start: 2019-02-20 | End: 2019-02-21

## 2019-02-19 RX ORDER — DOCUSATE SODIUM 100 MG
100 CAPSULE ORAL
Qty: 0 | Refills: 0 | Status: DISCONTINUED | OUTPATIENT
Start: 2019-02-20 | End: 2019-02-21

## 2019-02-19 RX ORDER — LANOLIN
1 OINTMENT (GRAM) TOPICAL EVERY 6 HOURS
Qty: 0 | Refills: 0 | Status: DISCONTINUED | OUTPATIENT
Start: 2019-02-20 | End: 2019-02-21

## 2019-02-19 RX ORDER — GENTAMICIN SULFATE 40 MG/ML
170 VIAL (ML) INJECTION ONCE
Qty: 0 | Refills: 0 | Status: DISCONTINUED | OUTPATIENT
Start: 2019-02-19 | End: 2019-02-19

## 2019-02-19 RX ORDER — GENTAMICIN SULFATE 40 MG/ML
VIAL (ML) INJECTION
Qty: 0 | Refills: 0 | Status: DISCONTINUED | OUTPATIENT
Start: 2019-02-19 | End: 2019-02-19

## 2019-02-19 RX ORDER — DIPHENHYDRAMINE HCL 50 MG
25 CAPSULE ORAL EVERY 6 HOURS
Qty: 0 | Refills: 0 | Status: DISCONTINUED | OUTPATIENT
Start: 2019-02-20 | End: 2019-02-21

## 2019-02-19 RX ORDER — OXYTOCIN 10 UNIT/ML
2 VIAL (ML) INJECTION
Qty: 30 | Refills: 0 | Status: DISCONTINUED | OUTPATIENT
Start: 2019-02-19 | End: 2019-02-20

## 2019-02-19 RX ORDER — IBUPROFEN 200 MG
600 TABLET ORAL EVERY 6 HOURS
Qty: 0 | Refills: 0 | Status: COMPLETED | OUTPATIENT
Start: 2019-02-20 | End: 2020-01-19

## 2019-02-19 RX ORDER — GLYCERIN ADULT
1 SUPPOSITORY, RECTAL RECTAL AT BEDTIME
Qty: 0 | Refills: 0 | Status: DISCONTINUED | OUTPATIENT
Start: 2019-02-20 | End: 2019-02-21

## 2019-02-19 RX ORDER — SODIUM CHLORIDE 9 MG/ML
500 INJECTION, SOLUTION INTRAVENOUS ONCE
Qty: 0 | Refills: 0 | Status: DISCONTINUED | OUTPATIENT
Start: 2019-02-19 | End: 2019-02-20

## 2019-02-19 RX ORDER — OXYCODONE HYDROCHLORIDE 5 MG/1
5 TABLET ORAL EVERY 4 HOURS
Qty: 0 | Refills: 0 | Status: DISCONTINUED | OUTPATIENT
Start: 2019-02-20 | End: 2019-02-21

## 2019-02-19 RX ORDER — ACETAMINOPHEN 500 MG
975 TABLET ORAL EVERY 6 HOURS
Qty: 0 | Refills: 0 | Status: COMPLETED | OUTPATIENT
Start: 2019-02-20 | End: 2020-01-19

## 2019-02-19 RX ORDER — OXYCODONE HYDROCHLORIDE 5 MG/1
5 TABLET ORAL
Qty: 0 | Refills: 0 | Status: DISCONTINUED | OUTPATIENT
Start: 2019-02-20 | End: 2019-02-21

## 2019-02-19 RX ORDER — SIMETHICONE 80 MG/1
80 TABLET, CHEWABLE ORAL EVERY 6 HOURS
Qty: 0 | Refills: 0 | Status: DISCONTINUED | OUTPATIENT
Start: 2019-02-20 | End: 2019-02-21

## 2019-02-19 RX ORDER — AER TRAVELER 0.5 G/1
1 SOLUTION RECTAL; TOPICAL EVERY 4 HOURS
Qty: 0 | Refills: 0 | Status: DISCONTINUED | OUTPATIENT
Start: 2019-02-20 | End: 2019-02-21

## 2019-02-19 RX ORDER — ACETAMINOPHEN 500 MG
975 TABLET ORAL ONCE
Qty: 0 | Refills: 0 | Status: COMPLETED | OUTPATIENT
Start: 2019-02-19 | End: 2019-02-19

## 2019-02-19 RX ADMIN — Medication 0.2 MILLIGRAM(S): at 22:30

## 2019-02-19 RX ADMIN — SODIUM CHLORIDE 125 MILLILITER(S): 9 INJECTION, SOLUTION INTRAVENOUS at 08:15

## 2019-02-19 RX ADMIN — Medication 975 MILLIGRAM(S): at 23:38

## 2019-02-19 RX ADMIN — Medication 100 MILLIGRAM(S): at 21:52

## 2019-02-19 RX ADMIN — Medication 250 MILLIGRAM(S): at 08:44

## 2019-02-19 RX ADMIN — Medication 2 MILLIUNIT(S)/MIN: at 08:13

## 2019-02-19 RX ADMIN — Medication 975 MILLIGRAM(S): at 21:50

## 2019-02-20 ENCOUNTER — APPOINTMENT (OUTPATIENT)
Dept: OBGYN | Facility: HOSPITAL | Age: 24
End: 2019-02-20

## 2019-02-20 ENCOUNTER — TRANSCRIPTION ENCOUNTER (OUTPATIENT)
Age: 24
End: 2019-02-20

## 2019-02-20 LAB — GP B STREP GENITAL QL CULT: SIGNIFICANT CHANGE UP

## 2019-02-20 RX ORDER — ACETAMINOPHEN 500 MG
3 TABLET ORAL
Qty: 0 | Refills: 0 | COMMUNITY
Start: 2019-02-20

## 2019-02-20 RX ORDER — DOCUSATE SODIUM 100 MG
1 CAPSULE ORAL
Qty: 0 | Refills: 0 | COMMUNITY
Start: 2019-02-20

## 2019-02-20 RX ORDER — NORETHINDRONE 0.35 MG/1
1 TABLET ORAL
Qty: 30 | Refills: 2 | OUTPATIENT
Start: 2019-02-20 | End: 2019-05-20

## 2019-02-20 RX ORDER — NORETHINDRONE 0.35 MG/1
1 TABLET ORAL
Qty: 30 | Refills: 2 | OUTPATIENT
Start: 2019-02-20 | End: 2019-05-21

## 2019-02-20 RX ORDER — ACETAMINOPHEN 500 MG
975 TABLET ORAL EVERY 6 HOURS
Qty: 0 | Refills: 0 | Status: DISCONTINUED | OUTPATIENT
Start: 2019-02-20 | End: 2019-02-21

## 2019-02-20 RX ORDER — IBUPROFEN 200 MG
1 TABLET ORAL
Qty: 0 | Refills: 0 | COMMUNITY
Start: 2019-02-20

## 2019-02-20 RX ORDER — ZOSTER VACCINE LIVE 19400 [PFU]/.65ML
0.65 INJECTION, POWDER, LYOPHILIZED, FOR SUSPENSION SUBCUTANEOUS ONCE
Qty: 0 | Refills: 0 | Status: DISCONTINUED | OUTPATIENT
Start: 2019-02-20 | End: 2019-02-20

## 2019-02-20 RX ORDER — IBUPROFEN 200 MG
600 TABLET ORAL EVERY 6 HOURS
Qty: 0 | Refills: 0 | Status: DISCONTINUED | OUTPATIENT
Start: 2019-02-20 | End: 2019-02-21

## 2019-02-20 RX ADMIN — Medication 600 MILLIGRAM(S): at 09:26

## 2019-02-20 RX ADMIN — Medication 975 MILLIGRAM(S): at 08:47

## 2019-02-20 RX ADMIN — Medication 600 MILLIGRAM(S): at 22:03

## 2019-02-20 RX ADMIN — PRAMOXINE HYDROCHLORIDE 1 APPLICATION(S): 150 AEROSOL, FOAM RECTAL at 08:47

## 2019-02-20 RX ADMIN — Medication 975 MILLIGRAM(S): at 22:02

## 2019-02-20 RX ADMIN — Medication 975 MILLIGRAM(S): at 09:26

## 2019-02-20 RX ADMIN — Medication 1 TABLET(S): at 08:47

## 2019-02-20 RX ADMIN — Medication 600 MILLIGRAM(S): at 08:47

## 2019-02-20 RX ADMIN — Medication 600 MILLIGRAM(S): at 22:35

## 2019-02-20 RX ADMIN — Medication 975 MILLIGRAM(S): at 22:35

## 2019-02-20 NOTE — DISCHARGE NOTE OB - MEDICATION SUMMARY - MEDICATIONS TO TAKE
I will START or STAY ON the medications listed below when I get home from the hospital:    acetaminophen 325 mg oral tablet  -- 3 tab(s) by mouth every 6 hours  -- Indication: For Moderate Pain    ibuprofen 600 mg oral tablet  -- 1 tab(s) by mouth every 6 hours  -- Indication: For Moderate Pain    Prenatal 1 oral capsule  -- 1 cap(s) by mouth once a day  -- Indication: For Postpartum    docusate sodium 100 mg oral capsule  -- 1 cap(s) by mouth 2 times a day, As needed, Stool Softening  -- Indication: For Constipation    norethindrone 0.35 mg oral tablet  -- 1 tab(s) by mouth once a day MDD:1  -- Do not take this drug if you are pregnant.  It is very important that you take or use this exactly as directed.  Do not skip doses or discontinue unless directed by your doctor.    -- Indication: For Birth Control I will START or STAY ON the medications listed below when I get home from the hospital:    acetaminophen 325 mg oral tablet  -- 3 tab(s) by mouth every 6 hours  -- Indication: For Moderate Pain    ibuprofen 600 mg oral tablet  -- 1 tab(s) by mouth every 6 hours  -- Indication: For Moderate Pain    Prenatal 1 oral capsule  -- 1 cap(s) by mouth once a day  -- Indication: For Postpartum    docusate sodium 100 mg oral capsule  -- 1 cap(s) by mouth 2 times a day, As needed, Stool Softening  -- Indication: For Constipation    norethindrone 0.35 mg oral tablet  -- 1 tab(s) by mouth once a day MDD:1  -- Do not take this drug if you are pregnant.  It is very important that you take or use this exactly as directed.  Do not skip doses or discontinue unless directed by your doctor.    -- Indication: For birth control

## 2019-02-20 NOTE — DISCHARGE NOTE OB - ADDITIONAL INSTRUCTIONS
Please call for  follow up  postpartum visit within 4-6  weeks of delivery date,  at Ambulatory Clinic Unit : [facility] :  [facility], 3rd floor : phone # [phone] or walk-in hours are: MONDAY 3-6 pm, WEDNESDAY 3-6 pm, FRIDAY 9-11 am, 1-3 pm

## 2019-02-20 NOTE — DISCHARGE NOTE OB - PROVIDER TOKENS
FREE:[LAST:[[facility]],PHONE:[phone]],FAX:[(   )    -],ADDRESS:[Please call for  follow up  postpartum visit within 4-6  weeks of delivery date,  at Ambulatory Clinic Unit : [facility] :  [facility], 3rd floor :]]

## 2019-02-20 NOTE — PROGRESS NOTE ADULT - ASSESSMENT
A/P: 22yo PPD#1 s/p . Patient had an intrapartum fever to 38.3, status post antibiotics. BPs also elevated intrapartum, HELLP labs wnl. BPs ranegd 120s-140s/60s-80s overnight. Patient denies symptoms of sPEC.  Patient is stable and doing well post-partum.

## 2019-02-20 NOTE — PROGRESS NOTE ADULT - SUBJECTIVE AND OBJECTIVE BOX
OB Progress Note:  PPD#1    S: 24yo  PPD#1 s/p . Patient feels well. Pain is well controlled. She is tolerating a regular diet and passing flatus. She is voiding spontaneously, and ambulating without difficulty. Denies CP/SOB. Denies lightheadedness/dizziness. Denies N/V.    O:  Vitals:  Vital Signs Last 24 Hrs  T(C): 36.9 (2019 01:00), Max: 37 (2019 22:30)  T(F): 98.4 (2019 01:00), Max: 98.6 (2019 22:30)  HR: 61 (:) (61 - 103)  BP: 134/77 (:00) (129/61 - 146/83)  BP(mean): --  RR: 17 (2019 01:00) (16 - 18)  SpO2: 99% (:00) (99% - 99%)    MEDICATIONS  (STANDING):  acetaminophen   Tablet .. 975 milliGRAM(s) Oral every 6 hours  ibuprofen  Tablet. 600 milliGRAM(s) Oral every 6 hours  lactated ringers Bolus 500 milliLiter(s) IV Bolus once  misoprostol 25 MICROGram(s) Vaginal every 4 hours  oxyCODONE    IR 5 milliGRAM(s) Oral every 3 hours  oxytocin Infusion 2 milliUNIT(s)/Min (2 mL/Hr) IV Continuous <Continuous>  prenatal multivitamin 1 Tablet(s) Oral daily      Labs:  Blood type: O Positive  Rubella IgG: Positive (10-02 @ 18:06)  RPR: Negative                          11.5   10.00 >-----------< 278    (  @ 15:55 )             35.6                        6.3<LL>   5.67 >-----------< 152    (  @ 15:10 )             19.3<LL>                        11.2<L>   6.79 >-----------< 298    (  @ 19:45 )             34.2<L>    19 @ 15:10      135  |  106  |  4<L>  ----------------------------<  76  4.2   |  15<L>  |  0.50    19 @ 19:45      137  |  104  |  6<L>  ----------------------------<  98  3.9   |  20<L>  |  0.53        Ca    8.3<L>      2019 15:10  Ca    8.9      2019 19:45    TPro  4.9<L>  /  Alb  2.4<L>  /  TBili  < 0.2<L>  /  DBili  x   /  AST  12  /  ALT  8   /  AlkPhos  164<H>  19 @ 15:10  TPro  6.3  /  Alb  3.4  /  TBili  < 0.2<L>  /  DBili  x   /  AST  14  /  ALT  11  /  AlkPhos  212<H>  19 @ 19:45          Physical Exam:  General: NAD  Abdomen: soft, non-tender, non-distended, fundus firm  Vaginal: Lochia wnl  Extremities: No erythema/edema

## 2019-02-20 NOTE — PROGRESS NOTE ADULT - PROBLEM SELECTOR PLAN 1
- Pain well controlled, continue current pain regimen  - Increase ambulation, SCDs when not ambulating  - Continue regular diet    Florina Corona PGY-1

## 2019-02-20 NOTE — DISCHARGE NOTE OB - CARE PROVIDER_API CALL
[facility],   Please call for  follow up  postpartum visit within 4-6  weeks of delivery date,  at Ambulatory Clinic Unit : [facility] :  [facility], 3rd floor :  Phone: [phone]  Fax: (   )    -  Follow Up Time:

## 2019-02-20 NOTE — DISCHARGE NOTE OB - HOME CARE AGENCY
[facility]   [phone], initial visit will be next day after discharge home, a nurse will call to arrange home visit

## 2019-02-20 NOTE — PROGRESS NOTE ADULT - SUBJECTIVE AND OBJECTIVE BOX
Anesthesia Post-op Note    POD#1 S/P     Patient is doing well.  OOBAA. Tolerating clears.  Pain is tolerable. Denies HA. Denies numbness/tingliness/pain in LE.  No residual anesthetic issues or complications noted.  T(C): 37.2 (19 @ 08:23), Max: 37.2 (19 @ 08:23)  HR: 75 (19 @ 08:23) (61 - 103)  BP: 137/65 (19 @ 08:23) (129/61 - 146/83)  RR: 18 (19 @ 08:23) (16 - 18)  SpO2: 98% (19 @ 08:23) (98% - 100%)

## 2019-02-20 NOTE — DISCHARGE NOTE OB - MEDICATION SUMMARY - MEDICATIONS TO STOP TAKING
I will STOP taking the medications listed below when I get home from the hospital:    progesterone 200 mg vaginal suppository  -- 1 suppository(ies) intravaginally once a day (at bedtime)   -- For vaginal use.  Keep in refrigerator.  Do not freeze.

## 2019-02-20 NOTE — DISCHARGE NOTE OB - MATERIALS PROVIDED
Breastfeeding Guide and Packet/[facility]  Screening Program/  Immunization Record/Breastfeeding Log/Shaken Baby Prevention Handout/Vaccinations/Breastfeeding Mother’s Support Group Information/Guide to Postpartum Care/[facility] Hearing Screen Program/Back To Sleep Handout

## 2019-02-20 NOTE — DISCHARGE NOTE OB - PATIENT PORTAL LINK FT
You can access the [facility] Patient Portal, offered by [facility], by registering with the following website: [email or web address]

## 2019-02-20 NOTE — DISCHARGE NOTE OB - HOSPITAL COURSE
Patient had uncomplicated, nonsurgical vaginal delivery.  Please see delivery note for details.  During postpartum course patient's vitals were stable, vaginal bleeding appropriate, and pain well controlled.  On day of discharge patient was ambulating, her pain controlled with oral medications, had adequate oral intake, and was voiding freely.  Discharge instructions and precautions were given.  Will return to clinic in 6 weeks for postpartum visit.  Postpartum birth control plan is micronor.

## 2019-02-21 VITALS
RESPIRATION RATE: 18 BRPM | DIASTOLIC BLOOD PRESSURE: 76 MMHG | OXYGEN SATURATION: 100 % | TEMPERATURE: 98 F | HEART RATE: 62 BPM | SYSTOLIC BLOOD PRESSURE: 129 MMHG

## 2019-02-21 RX ORDER — TETANUS TOXOID, REDUCED DIPHTHERIA TOXOID AND ACELLULAR PERTUSSIS VACCINE, ADSORBED 5; 2.5; 8; 8; 2.5 [IU]/.5ML; [IU]/.5ML; UG/.5ML; UG/.5ML; UG/.5ML
0.5 SUSPENSION INTRAMUSCULAR ONCE
Qty: 0 | Refills: 0 | Status: COMPLETED | OUTPATIENT
Start: 2019-02-21 | End: 2019-02-21

## 2019-02-21 RX ORDER — PRAMOXINE HYDROCHLORIDE 150 MG/15G
1 AEROSOL, FOAM RECTAL EVERY 4 HOURS
Qty: 0 | Refills: 0 | Status: DISCONTINUED | OUTPATIENT
Start: 2019-02-21 | End: 2019-02-21

## 2019-02-21 RX ORDER — HYDROCORTISONE 1 %
1 OINTMENT (GRAM) TOPICAL EVERY 4 HOURS
Qty: 0 | Refills: 0 | Status: DISCONTINUED | OUTPATIENT
Start: 2019-02-21 | End: 2019-02-21

## 2019-02-21 RX ADMIN — Medication 975 MILLIGRAM(S): at 08:03

## 2019-02-21 RX ADMIN — TETANUS TOXOID, REDUCED DIPHTHERIA TOXOID AND ACELLULAR PERTUSSIS VACCINE, ADSORBED 0.5 MILLILITER(S): 5; 2.5; 8; 8; 2.5 SUSPENSION INTRAMUSCULAR at 10:46

## 2019-02-21 RX ADMIN — Medication 0.5 MILLILITER(S): at 11:13

## 2019-02-21 RX ADMIN — Medication 975 MILLIGRAM(S): at 08:42

## 2019-02-21 NOTE — PROGRESS NOTE ADULT - ASSESSMENT
A/P: 24yo PPD#2 s/p . Patient had an intrapartum fever to 38.3, status post antibiotics. BPs also elevated intrapartum, HELLP labs wnl. BPs well controlled overnight. Denies symptoms of sPEC.  Patient is stable and doing well post-partum.

## 2019-02-21 NOTE — PROGRESS NOTE ADULT - SUBJECTIVE AND OBJECTIVE BOX
OB Progress Note:  PPD#2    S: 22yo PPD#2 s/p . Patient feels well. Pain is well controlled. She is tolerating a regular diet and passing flatus. She is voiding spontaneously, and ambulating without difficulty. Denies CP/SOB. Denies lightheadedness/dizziness. Denies N/V.    O:  Vitals:   Vital Signs Last 24 Hrs  T(C): 36.4 (2019 05:49), Max: 36.9 (2019 21:58)  T(F): 97.6 (2019 05:49), Max: 98.4 (2019 21:58)  HR: 62 (2019 05:49) (62 - 73)  BP: 129/76 (2019 05:49) (129/76 - 133/72)  BP(mean): --  RR: 18 (2019 05:49) (18 - 18)  SpO2: 100% (2019 05:49) (100% - 100%)    MEDICATIONS  (STANDING):  acetaminophen   Tablet .. 975 milliGRAM(s) Oral every 6 hours  ibuprofen  Tablet. 600 milliGRAM(s) Oral every 6 hours  oxyCODONE    IR 5 milliGRAM(s) Oral every 3 hours  prenatal multivitamin 1 Tablet(s) Oral daily  varicella virus (live) Vaccine 0.5 milliLiter(s) SubCutaneous once    MEDICATIONS  (PRN):  dibucaine 1% Ointment 1 Application(s) Topical every 4 hours PRN Perineal Discomfort  diphenhydrAMINE 25 milliGRAM(s) Oral every 6 hours PRN Itching  docusate sodium 100 milliGRAM(s) Oral two times a day PRN Stool Softening  glycerin Suppository - Adult 1 Suppository(s) Rectal at bedtime PRN Constipation  hydrocortisone 1% Cream 1 Application(s) Topical every 4 hours PRN perineal discomfort  lanolin Ointment 1 Application(s) Topical every 6 hours PRN Sore Nipples  magnesium hydroxide Suspension 30 milliLiter(s) Oral two times a day PRN Constipation  oxyCODONE    IR 5 milliGRAM(s) Oral every 4 hours PRN Severe Pain (7 -10)  pramoxine 1%/zinc 5% Cream 1 Application(s) Topical every 4 hours PRN perineal discomfort  simethicone 80 milliGRAM(s) Chew every 6 hours PRN Gas  witch hazel Pads 1 Application(s) Topical every 4 hours PRN Perineal Discomfort      Labs:  Blood type: O Positive  Rubella IgG: Positive (10-02 @ 18:06)  RPR: Negative                          11.5   10.00 >-----------< 278    (  @ 15:55 )             35.6                        6.3<LL>   5.67 >-----------< 152    (  @ 15:10 )             19.3<LL>    19 @ 15:10      135  |  106  |  4<L>  ----------------------------<  76  4.2   |  15<L>  |  0.50        Ca    8.3<L>      2019 15:10    TPro  4.9<L>  /  Alb  2.4<L>  /  TBili  < 0.2<L>  /  DBili  x   /  AST  12  /  ALT  8   /  AlkPhos  164<H>  19 @ 15:10          Physical Exam:  General: NAD  Abdomen: soft, non-tender, non-distended, fundus firm  Vaginal: Lochia wnl  Extremities: No erythema/edema

## 2019-02-21 NOTE — PROGRESS NOTE ADULT - PROBLEM SELECTOR PLAN 1
- Pain well controlled, continue current pain regimen  - Increase ambulation, SCDs when not ambulating  - Continue regular diet  - Discharge planning     Florina Corona PGY-1

## 2019-02-22 LAB — T PALLIDUM AB TITR SER: NEGATIVE — SIGNIFICANT CHANGE UP

## 2019-02-26 ENCOUNTER — EMERGENCY (EMERGENCY)
Facility: HOSPITAL | Age: 24
LOS: 1 days | Discharge: STILL ADMITTED OR ADMINISTRATIVE | End: 2019-02-26
Admitting: EMERGENCY MEDICINE

## 2019-02-26 ENCOUNTER — INPATIENT (INPATIENT)
Facility: HOSPITAL | Age: 24
LOS: 2 days | Discharge: HOME | End: 2019-03-01
Attending: OBSTETRICS & GYNECOLOGY | Admitting: OBSTETRICS & GYNECOLOGY
Payer: MEDICAID

## 2019-02-26 VITALS
SYSTOLIC BLOOD PRESSURE: 171 MMHG | TEMPERATURE: 98 F | OXYGEN SATURATION: 100 % | DIASTOLIC BLOOD PRESSURE: 96 MMHG | HEART RATE: 67 BPM | RESPIRATION RATE: 16 BRPM

## 2019-02-26 VITALS — DIASTOLIC BLOOD PRESSURE: 94 MMHG | HEART RATE: 76 BPM | SYSTOLIC BLOOD PRESSURE: 172 MMHG

## 2019-02-26 DIAGNOSIS — O16.9 UNSPECIFIED MATERNAL HYPERTENSION, UNSPECIFIED TRIMESTER: ICD-10-CM

## 2019-02-26 DIAGNOSIS — Z98.890 OTHER SPECIFIED POSTPROCEDURAL STATES: Chronic | ICD-10-CM

## 2019-02-26 LAB
ALBUMIN SERPL ELPH-MCNC: 3.2 G/DL — LOW (ref 3.3–5)
ALP SERPL-CCNC: 152 U/L — HIGH (ref 40–120)
ALT FLD-CCNC: 41 U/L — HIGH (ref 4–33)
ANION GAP SERPL CALC-SCNC: 13 MMO/L — SIGNIFICANT CHANGE UP (ref 7–14)
APPEARANCE UR: CLEAR — SIGNIFICANT CHANGE UP
APTT BLD: 29.9 SEC — SIGNIFICANT CHANGE UP (ref 27.5–36.3)
AST SERPL-CCNC: 19 U/L — SIGNIFICANT CHANGE UP (ref 4–32)
BACTERIA # UR AUTO: NEGATIVE — SIGNIFICANT CHANGE UP
BASOPHILS # BLD AUTO: 0.03 K/UL — SIGNIFICANT CHANGE UP (ref 0–0.2)
BASOPHILS NFR BLD AUTO: 0.4 % — SIGNIFICANT CHANGE UP (ref 0–2)
BILIRUB SERPL-MCNC: 0.2 MG/DL — SIGNIFICANT CHANGE UP (ref 0.2–1.2)
BILIRUB UR-MCNC: NEGATIVE — SIGNIFICANT CHANGE UP
BLD GP AB SCN SERPL QL: NEGATIVE — SIGNIFICANT CHANGE UP
BLOOD UR QL VISUAL: HIGH
BUN SERPL-MCNC: 14 MG/DL — SIGNIFICANT CHANGE UP (ref 7–23)
CALCIUM SERPL-MCNC: 8.8 MG/DL — SIGNIFICANT CHANGE UP (ref 8.4–10.5)
CHLORIDE SERPL-SCNC: 104 MMOL/L — SIGNIFICANT CHANGE UP (ref 98–107)
CO2 SERPL-SCNC: 22 MMOL/L — SIGNIFICANT CHANGE UP (ref 22–31)
COLOR SPEC: SIGNIFICANT CHANGE UP
CREAT SERPL-MCNC: 0.77 MG/DL — SIGNIFICANT CHANGE UP (ref 0.5–1.3)
EOSINOPHIL # BLD AUTO: 0.26 K/UL — SIGNIFICANT CHANGE UP (ref 0–0.5)
EOSINOPHIL NFR BLD AUTO: 3.4 % — SIGNIFICANT CHANGE UP (ref 0–6)
FIBRINOGEN PPP-MCNC: 642 MG/DL — HIGH (ref 350–510)
GLUCOSE SERPL-MCNC: 80 MG/DL — SIGNIFICANT CHANGE UP (ref 70–99)
GLUCOSE UR-MCNC: NEGATIVE — SIGNIFICANT CHANGE UP
HCT VFR BLD CALC: 31.3 % — LOW (ref 34.5–45)
HGB BLD-MCNC: 10.5 G/DL — LOW (ref 11.5–15.5)
HYALINE CASTS # UR AUTO: NEGATIVE — SIGNIFICANT CHANGE UP
IMM GRANULOCYTES NFR BLD AUTO: 1.3 % — SIGNIFICANT CHANGE UP (ref 0–1.5)
INR BLD: 1.04 — SIGNIFICANT CHANGE UP (ref 0.88–1.17)
KETONES UR-MCNC: NEGATIVE — SIGNIFICANT CHANGE UP
LDH SERPL L TO P-CCNC: 302 U/L — HIGH (ref 135–225)
LEUKOCYTE ESTERASE UR-ACNC: SIGNIFICANT CHANGE UP
LYMPHOCYTES # BLD AUTO: 1.78 K/UL — SIGNIFICANT CHANGE UP (ref 1–3.3)
LYMPHOCYTES # BLD AUTO: 23 % — SIGNIFICANT CHANGE UP (ref 13–44)
MAGNESIUM SERPL-MCNC: 4.1 MG/DL — HIGH (ref 1.6–2.6)
MAGNESIUM SERPL-MCNC: 4.5 MG/DL — HIGH (ref 1.6–2.6)
MCHC RBC-ENTMCNC: 24 PG — LOW (ref 27–34)
MCHC RBC-ENTMCNC: 33.5 % — SIGNIFICANT CHANGE UP (ref 32–36)
MCV RBC AUTO: 71.6 FL — LOW (ref 80–100)
MONOCYTES # BLD AUTO: 0.71 K/UL — SIGNIFICANT CHANGE UP (ref 0–0.9)
MONOCYTES NFR BLD AUTO: 9.2 % — SIGNIFICANT CHANGE UP (ref 2–14)
NEUTROPHILS # BLD AUTO: 4.86 K/UL — SIGNIFICANT CHANGE UP (ref 1.8–7.4)
NEUTROPHILS NFR BLD AUTO: 62.7 % — SIGNIFICANT CHANGE UP (ref 43–77)
NITRITE UR-MCNC: NEGATIVE — SIGNIFICANT CHANGE UP
NRBC # FLD: 0 K/UL — LOW (ref 25–125)
PH UR: 6.5 — SIGNIFICANT CHANGE UP (ref 5–8)
PLATELET # BLD AUTO: 414 K/UL — HIGH (ref 150–400)
PMV BLD: 12.3 FL — SIGNIFICANT CHANGE UP (ref 7–13)
POTASSIUM SERPL-MCNC: 4 MMOL/L — SIGNIFICANT CHANGE UP (ref 3.5–5.3)
POTASSIUM SERPL-SCNC: 4 MMOL/L — SIGNIFICANT CHANGE UP (ref 3.5–5.3)
PROT SERPL-MCNC: 6.4 G/DL — SIGNIFICANT CHANGE UP (ref 6–8.3)
PROT UR-MCNC: NEGATIVE — SIGNIFICANT CHANGE UP
PROTHROM AB SERPL-ACNC: 11.9 SEC — SIGNIFICANT CHANGE UP (ref 9.8–13.1)
RBC # BLD: 4.37 M/UL — SIGNIFICANT CHANGE UP (ref 3.8–5.2)
RBC # FLD: 16.9 % — HIGH (ref 10.3–14.5)
RBC CASTS # UR COMP ASSIST: >50 — HIGH (ref 0–?)
RH IG SCN BLD-IMP: POSITIVE — SIGNIFICANT CHANGE UP
SODIUM SERPL-SCNC: 139 MMOL/L — SIGNIFICANT CHANGE UP (ref 135–145)
SP GR SPEC: 1.01 — SIGNIFICANT CHANGE UP (ref 1–1.04)
SQUAMOUS # UR AUTO: SIGNIFICANT CHANGE UP
URATE SERPL-MCNC: 6.9 MG/DL — SIGNIFICANT CHANGE UP (ref 2.5–7)
UROBILINOGEN FLD QL: NORMAL — SIGNIFICANT CHANGE UP
WBC # BLD: 7.74 K/UL — SIGNIFICANT CHANGE UP (ref 3.8–10.5)
WBC # FLD AUTO: 7.74 K/UL — SIGNIFICANT CHANGE UP (ref 3.8–10.5)
WBC UR QL: HIGH (ref 0–?)

## 2019-02-26 PROCEDURE — 70496 CT ANGIOGRAPHY HEAD: CPT | Mod: 26

## 2019-02-26 PROCEDURE — 70450 CT HEAD/BRAIN W/O DYE: CPT | Mod: 26

## 2019-02-26 RX ORDER — MAGNESIUM SULFATE 500 MG/ML
2 VIAL (ML) INJECTION
Qty: 40 | Refills: 0 | Status: DISCONTINUED | OUTPATIENT
Start: 2019-02-26 | End: 2019-02-27

## 2019-02-26 RX ORDER — HEPARIN SODIUM 5000 [USP'U]/ML
5000 INJECTION INTRAVENOUS; SUBCUTANEOUS EVERY 12 HOURS
Qty: 0 | Refills: 0 | Status: DISCONTINUED | OUTPATIENT
Start: 2019-02-26 | End: 2019-03-01

## 2019-02-26 RX ORDER — SIMETHICONE 80 MG/1
80 TABLET, CHEWABLE ORAL EVERY 4 HOURS
Qty: 0 | Refills: 0 | Status: DISCONTINUED | OUTPATIENT
Start: 2019-02-26 | End: 2019-02-27

## 2019-02-26 RX ORDER — SODIUM CHLORIDE 9 MG/ML
1000 INJECTION, SOLUTION INTRAVENOUS
Qty: 0 | Refills: 0 | Status: DISCONTINUED | OUTPATIENT
Start: 2019-02-26 | End: 2019-03-01

## 2019-02-26 RX ORDER — LANOLIN
1 OINTMENT (GRAM) TOPICAL
Qty: 0 | Refills: 0 | Status: DISCONTINUED | OUTPATIENT
Start: 2019-02-26 | End: 2019-02-27

## 2019-02-26 RX ORDER — DOCUSATE SODIUM 100 MG
100 CAPSULE ORAL
Qty: 0 | Refills: 0 | Status: DISCONTINUED | OUTPATIENT
Start: 2019-02-26 | End: 2019-02-27

## 2019-02-26 RX ORDER — NIFEDIPINE 30 MG
30 TABLET, EXTENDED RELEASE 24 HR ORAL DAILY
Qty: 0 | Refills: 0 | Status: DISCONTINUED | OUTPATIENT
Start: 2019-02-26 | End: 2019-03-01

## 2019-02-26 RX ORDER — OXYCODONE HYDROCHLORIDE 5 MG/1
5 TABLET ORAL EVERY 4 HOURS
Qty: 0 | Refills: 0 | Status: DISCONTINUED | OUTPATIENT
Start: 2019-02-26 | End: 2019-02-27

## 2019-02-26 RX ORDER — LABETALOL HCL 100 MG
40 TABLET ORAL ONCE
Qty: 0 | Refills: 0 | Status: COMPLETED | OUTPATIENT
Start: 2019-02-26 | End: 2019-02-26

## 2019-02-26 RX ORDER — LABETALOL HCL 100 MG
20 TABLET ORAL ONCE
Qty: 0 | Refills: 0 | Status: COMPLETED | OUTPATIENT
Start: 2019-02-26 | End: 2019-02-26

## 2019-02-26 RX ORDER — MAGNESIUM SULFATE 500 MG/ML
4 VIAL (ML) INJECTION ONCE
Qty: 0 | Refills: 0 | Status: COMPLETED | OUTPATIENT
Start: 2019-02-26 | End: 2019-02-26

## 2019-02-26 RX ORDER — ACETAMINOPHEN 500 MG
975 TABLET ORAL ONCE
Qty: 0 | Refills: 0 | Status: COMPLETED | OUTPATIENT
Start: 2019-02-26 | End: 2019-02-26

## 2019-02-26 RX ORDER — LABETALOL HCL 100 MG
200 TABLET ORAL THREE TIMES A DAY
Qty: 0 | Refills: 0 | Status: DISCONTINUED | OUTPATIENT
Start: 2019-02-26 | End: 2019-02-26

## 2019-02-26 RX ORDER — HYDRALAZINE HCL 50 MG
5 TABLET ORAL ONCE
Qty: 0 | Refills: 0 | Status: COMPLETED | OUTPATIENT
Start: 2019-02-26 | End: 2019-02-26

## 2019-02-26 RX ORDER — LABETALOL HCL 100 MG
200 TABLET ORAL THREE TIMES A DAY
Qty: 0 | Refills: 0 | Status: DISCONTINUED | OUTPATIENT
Start: 2019-02-26 | End: 2019-03-01

## 2019-02-26 RX ORDER — GLYCERIN ADULT
1 SUPPOSITORY, RECTAL RECTAL AT BEDTIME
Qty: 0 | Refills: 0 | Status: DISCONTINUED | OUTPATIENT
Start: 2019-02-26 | End: 2019-02-27

## 2019-02-26 RX ORDER — FERROUS SULFATE 325(65) MG
325 TABLET ORAL DAILY
Qty: 0 | Refills: 0 | Status: DISCONTINUED | OUTPATIENT
Start: 2019-02-26 | End: 2019-02-27

## 2019-02-26 RX ORDER — ACETAMINOPHEN 500 MG
975 TABLET ORAL EVERY 6 HOURS
Qty: 0 | Refills: 0 | Status: DISCONTINUED | OUTPATIENT
Start: 2019-02-26 | End: 2019-02-27

## 2019-02-26 RX ORDER — IBUPROFEN 200 MG
600 TABLET ORAL EVERY 6 HOURS
Qty: 0 | Refills: 0 | Status: DISCONTINUED | OUTPATIENT
Start: 2019-02-26 | End: 2019-02-27

## 2019-02-26 RX ORDER — TETANUS TOXOID, REDUCED DIPHTHERIA TOXOID AND ACELLULAR PERTUSSIS VACCINE, ADSORBED 5; 2.5; 8; 8; 2.5 [IU]/.5ML; [IU]/.5ML; UG/.5ML; UG/.5ML; UG/.5ML
0.5 SUSPENSION INTRAMUSCULAR ONCE
Qty: 0 | Refills: 0 | Status: DISCONTINUED | OUTPATIENT
Start: 2019-02-26 | End: 2019-02-27

## 2019-02-26 RX ORDER — DIPHENHYDRAMINE HCL 50 MG
25 CAPSULE ORAL EVERY 6 HOURS
Qty: 0 | Refills: 0 | Status: DISCONTINUED | OUTPATIENT
Start: 2019-02-26 | End: 2019-02-27

## 2019-02-26 RX ADMIN — Medication 975 MILLIGRAM(S): at 23:50

## 2019-02-26 RX ADMIN — Medication 975 MILLIGRAM(S): at 04:28

## 2019-02-26 RX ADMIN — Medication 200 MILLIGRAM(S): at 04:30

## 2019-02-26 RX ADMIN — Medication 975 MILLIGRAM(S): at 13:41

## 2019-02-26 RX ADMIN — OXYCODONE HYDROCHLORIDE 5 MILLIGRAM(S): 5 TABLET ORAL at 08:11

## 2019-02-26 RX ADMIN — Medication 975 MILLIGRAM(S): at 19:50

## 2019-02-26 RX ADMIN — Medication 200 MILLIGRAM(S): at 20:37

## 2019-02-26 RX ADMIN — Medication 975 MILLIGRAM(S): at 18:53

## 2019-02-26 RX ADMIN — Medication 50 GM/HR: at 19:41

## 2019-02-26 RX ADMIN — Medication 600 MILLIGRAM(S): at 23:50

## 2019-02-26 RX ADMIN — Medication 200 MILLIGRAM(S): at 12:41

## 2019-02-26 RX ADMIN — Medication 50 GM/HR: at 11:49

## 2019-02-26 RX ADMIN — OXYCODONE HYDROCHLORIDE 5 MILLIGRAM(S): 5 TABLET ORAL at 09:00

## 2019-02-26 RX ADMIN — SODIUM CHLORIDE 50 MILLILITER(S): 9 INJECTION, SOLUTION INTRAVENOUS at 12:00

## 2019-02-26 RX ADMIN — Medication 20 MILLIGRAM(S): at 04:14

## 2019-02-26 RX ADMIN — Medication 600 MILLIGRAM(S): at 16:49

## 2019-02-26 RX ADMIN — Medication 975 MILLIGRAM(S): at 05:07

## 2019-02-26 RX ADMIN — Medication 325 MILLIGRAM(S): at 18:53

## 2019-02-26 RX ADMIN — Medication 100 GRAM(S): at 05:06

## 2019-02-26 RX ADMIN — SODIUM CHLORIDE 50 MILLILITER(S): 9 INJECTION, SOLUTION INTRAVENOUS at 05:00

## 2019-02-26 RX ADMIN — HEPARIN SODIUM 5000 UNIT(S): 5000 INJECTION INTRAVENOUS; SUBCUTANEOUS at 20:45

## 2019-02-26 RX ADMIN — Medication 975 MILLIGRAM(S): at 12:41

## 2019-02-26 RX ADMIN — Medication 40 MILLIGRAM(S): at 04:28

## 2019-02-26 RX ADMIN — Medication 600 MILLIGRAM(S): at 16:19

## 2019-02-26 RX ADMIN — Medication 1 TABLET(S): at 18:53

## 2019-02-26 RX ADMIN — HEPARIN SODIUM 5000 UNIT(S): 5000 INJECTION INTRAVENOUS; SUBCUTANEOUS at 09:09

## 2019-02-26 RX ADMIN — Medication 30 MILLIGRAM(S): at 23:50

## 2019-02-26 RX ADMIN — Medication 5 MILLIGRAM(S): at 04:41

## 2019-02-26 RX ADMIN — Medication 50 GM/HR: at 06:45

## 2019-02-26 NOTE — PROVIDER CONTACT NOTE (OTHER) - ASSESSMENT
Blood pressures at 2210 148/90 and then 2225 152/94. 22:10 T 97.8 P77 R 18 O2 100%. +2 brachioradial reflex. +2 patellar reflex. Lung sounds clear and equal. Last mag level @1750 4.5.

## 2019-02-26 NOTE — CONSULT NOTE ADULT - ATTENDING COMMENTS
Patient seen and examined and above note reviewed and I agree with assessment and plan as outlined. Patient with intense headache at base of neck and top of head and behind eyes.  Much improved today with better blood pressure control. Exam is nonfocal and CTV reviewed in detail and was normal and no evidence of venous sinus thrombosis.  Continue BP control and supportive care and please call us with any additional questions.

## 2019-02-26 NOTE — H&P ADULT - ASSESSMENT
This is a 23 year old  s/p  on 19 complicated by maternal temp presents with complaints of worst headache of her life radiating from base of her neck to the front, eye " feel funny" and lower extremity swelling. pt states symptoms started at 2pm, headache worsened at 10pm and took 52195 mg of Tylenol with no relief. denies nausea/ vomitting, epigastric pain,. denies BP issues during pregnancy. Pt is currently staying at [facility] to be close to her baby who is in NICU for maternal temp    med: denies  surg: d&C  gyn: sab  ob:  complicated by maternal temp    assessment/plan  vss bp  177/79, 172/97, 172/100  Labetolol 10 mg IVP at 0414    163/99, 171/101  Labetolol 40 mg IVP at 0428    178/105  Hydralazine 5 mg IVP    Tylenol 975 mg at 0428  Labetolol 200 mg tid at 0430    magnesium sulfate therapy initiated at     plan discussed with dr hook, dr laguna This is a 23 year old  s/p  on 19 complicated by maternal temp presents with complaints of worst headache of her life radiating from base of her neck to the front, eye " feel funny" and lower extremity swelling. pt states symptoms started at 2pm, headache worsened at 10pm and took 87918 mg of Tylenol with no relief. denies nausea/ vomitting, epigastric pain,. denies BP issues during pregnancy. Pt is currently staying at [facility] to be close to her baby who is in NICU for maternal temp.  pt reports been seen by cardiology during pregnancy for tachycardia, SOB and palpitations. EKG showered sinus tachycardia and echo was wnl    med: denies  surg: d&C  gyn: sab  ob:  complicated by maternal temp    assessment/plan  vss bp  177/79, 172/97, 172/100  Labetolol 10 mg IVP at 0414    163/99, 171/101  Labetolol 40 mg IVP at 0428    178/105  Hydralazine 5 mg IVP    Tylenol 975 mg at 0428  Labetolol 200 mg tid at 0430    magnesium sulfate therapy initiated at     plan discussed with dr hook, dr laguna This is a 23 year old  s/p  on 19 complicated by maternal temp presents with complaints of worst headache of her life radiating from base of her neck to the front, eye " feel funny" and lower extremity swelling. pt states symptoms started at 2pm, headache worsened at 10pm and took 73132 mg of Tylenol with no relief. denies nausea/ vomitting, epigastric pain,. denies BP issues during pregnancy. Pt is currently staying at [facility] to be close to her baby who is in NICU for maternal temp.  pt reports been seen by cardiology during pregnancy for tachycardia, SOB and palpitations. EKG showered sinus tachycardia and echo was wnl    med: denies  surg: d&C  gyn: sab  ob:  complicated by maternal temp    assessment/plan  vss bp  177/79, 172/97, 172/100  Labetolol 10 mg IVP at 0414    163/99, 171/101  Labetolol 40 mg IVP at 0428    178/105  Hydralazine 5 mg IVP at 0441    Tylenol 975 mg at 0428  Labetolol 200 mg tid at 0430    magnesium sulfate therapy 4 gram loading dose initiated at 0506    plan discussed with dr hook, dr laguna

## 2019-02-26 NOTE — CONSULT NOTE ADULT - SUBJECTIVE AND OBJECTIVE BOX
HPI:  Patient is a 23 year old female with no known PMHx,  s/p  on 19 c/b maternal temp who presented to the ED on 19 for WHOL which began in the afternoon on 19. The headache starts at the bottom of her neck and radiates up to her eyes, she also has b/l eye pain and sore neck (not stiff). She took tylenol 1000 mg with minimal relief. Headache is 9/10 at worst, waxes and wanes in intensity, and is not positional. She has tunneling of vision and sees chaidez (likely aura) and has fullness/popping sensation in her ears, and has photophobia and phonophobia She usually does not get headaches, no history of headaches in her family. No focal symptoms otherwise (no weakness, numbness, tingling, change in speech, dizziness)  She is also admitted for postpartum pre-eclampsia.     MEDICATIONS  (STANDING):  acetaminophen   Tablet .. 975 milliGRAM(s) Oral every 6 hours  diphtheria/tetanus/pertussis (acellular) Vaccine (ADAcel) 0.5 milliLiter(s) IntraMuscular once  ferrous    sulfate 325 milliGRAM(s) Oral daily  heparin  Injectable 5000 Unit(s) SubCutaneous every 12 hours  ibuprofen  Tablet. 600 milliGRAM(s) Oral every 6 hours  labetalol 200 milliGRAM(s) Oral three times a day  lactated ringers. 1000 milliLiter(s) (50 mL/Hr) IV Continuous <Continuous>  magnesium sulfate Infusion 2 Gm/Hr (50 mL/Hr) IV Continuous <Continuous>  prenatal multivitamin 1 Tablet(s) Oral daily    MEDICATIONS  (PRN):  diphenhydrAMINE 25 milliGRAM(s) Oral every 6 hours PRN Itching  docusate sodium 100 milliGRAM(s) Oral two times a day PRN Stool Softening  glycerin Suppository - Adult 1 Suppository(s) Rectal at bedtime PRN Constipation  lanolin Ointment 1 Application(s) Topical every 3 hours PRN Sore Nipples  oxyCODONE    IR 5 milliGRAM(s) Oral every 4 hours PRN Moderate Pain (4 - 6)  simethicone 80 milliGRAM(s) Chew every 4 hours PRN Gas    PAST MEDICAL & SURGICAL HISTORY:  No pertinent past medical history  History of D&C    FAMILY HISTORY:  No pertinent family history in first degree relatives    Allergies  latex (Short breath; Hives)  penicillin (Anaphylaxis; Pruritus; Swelling; Short breath; Hives)    Review of Systems:  CONSTITUTIONAL:  No weight loss, fever, chills, weakness or fatigue.  HEENT:  Eyes:  No visual loss, blurred vision, double vision or yellow sclerae. Ears, Nose, Throat:  No hearing loss, sneezing, congestion, runny nose or sore throat.  CARDIOVASCULAR:  No chest pain, chest pressure or chest discomfort. No palpitations or edema.  GASTROINTESTINAL:  No anorexia, nausea, vomiting or diarrhea. No abdominal pain or blood.  NEUROLOGICAL: See HPI  MUSCULOSKELETAL:  No muscle, back pain, joint pain or stiffness.  PSYCHIATRIC:  No history of depression or anxiety.    Vital Signs Last 24 Hrs  T(C): 36.8 (2019 12:00), Max: 36.8 (2019 03:37)  T(F): 98.3 (2019 12:00), Max: 98.3 (2019 03:37)  HR: 81 (2019 12:00) (67 - 92)  BP: 144/98 (2019 12:00) (127/71 - 172/94)  BP(mean): 90 (2019 10:45) (84 - 111)  RR: 20 (2019 12:00) (14 - 24)  SpO2: 100% (2019 12:00) (90% - 100%)    General Exam:   General appearance: No acute distress                 Neurological Exam:  Mental Status: Orientated to self, date and place.    No dysarthria, aphasia or neglect.      Cranial Nerves: PERRL, EOMI, VFF, no nystagmus  No facial asymmetry.  Hearing intact to finger rub bilaterally.     Motor:   Tone: normal.                  Strength:     [] Upper extremity                      Delt       Bicep    Tricep                                                  R         5/5        5/5        5/5       5/5                                               L          5/5        5/5        5/5       5/5  [] Lower extremity                       HF          KE          KF        DF         PF                                               R        5/5        5/5        5/5       5/5       5/5                                               L         5/5        5/5       5/5       5/5        5/5  Pronator drift: none                 Dysmetria: BL NL FTN  No truncal ataxia.    Tremor: No resting, postural or action tremor.  No myoclonus.    Sensation: intact to light touch  Toes flexor bilaterally  Gait: normal and stable.      Other:  Radiology  CT head non con: unremarkable

## 2019-02-26 NOTE — PROVIDER CONTACT NOTE (OTHER) - BACKGROUND
Pt had a NSD on 2/19. Admitted to ED on 2/26 with headaches dizziness and high Blood pressures. Pt started on Magnesium Sulfate @5am on 2/26

## 2019-02-26 NOTE — CONSULT NOTE ADULT - ASSESSMENT
23 year old female with no known PMHx,  s/p  on 19 c/b maternal temp who presented to the ED on 19 for WHOL which began in the afternoon on 19. The headache starts at the bottom of her neck and radiates up to her eyes, she also has b/l eye pain and sore neck (not stiff). She took tylenol 1000 mg with minimal relief. Headache is 9/10 at worst, waxes and wanes in intensity, and is not positional. She has tunneling of vision and sees chaidez (likely aura) and has fullness/popping sensation in her ears, and has photophobia and phonophobia She usually does not get headaches, no history of headaches in her family. No focal symptoms otherwise (no weakness, numbness, tingling, change in speech, dizziness)  She is also admitted for postpartum pre-eclampsia.   CTh is unremarkable for bleed, and neurologic exam is nonfocal.   Etiology: r/o venous sinus thrombosis, otherwise likely migranous headache or due to hypertension    Plan  [] CTV with contrast (patient cannot tolerate MRI/MRV, she is very claustrophobic even with sedation)  [] PRN pain control (can try steroids - decadron 10 mg IV if tylenol, oxycodone, motrin do not work)  [] BP control

## 2019-02-26 NOTE — PROVIDER CONTACT NOTE (OTHER) - ACTION/TREATMENT ORDERED:
As per MD Lee Carey retake Blood pressure in 1 hour and report value back. Will continue to monitor.

## 2019-02-26 NOTE — H&P ADULT - HISTORY OF PRESENT ILLNESS
pt complaining of worst headache of her life from the base of her neck to the front,  eyes "feel funny", swelling. pt states symptoms started at 2pm, took tylenol at 10pm with no relief

## 2019-02-26 NOTE — ED ADULT TRIAGE NOTE - CHIEF COMPLAINT QUOTE
pt , s/p vaginal delivery 1 week ago (), GYN- Lawanda Valencia. c/o HOGUE since Monday morning. pt took Tylenol with no relief. pt denies CP/SOB. no Hx of pre-eclampsia during pregnancy. spoke to Goldie in L&D, pt to be seen upstairs.

## 2019-02-27 LAB — MAGNESIUM SERPL-MCNC: 5 MG/DL — HIGH (ref 1.6–2.6)

## 2019-02-27 PROCEDURE — 99222 1ST HOSP IP/OBS MODERATE 55: CPT | Mod: GC

## 2019-02-27 RX ORDER — OXYCODONE HYDROCHLORIDE 5 MG/1
5 TABLET ORAL
Qty: 0 | Refills: 0 | Status: DISCONTINUED | OUTPATIENT
Start: 2019-02-27 | End: 2019-03-01

## 2019-02-27 RX ORDER — IBUPROFEN 200 MG
600 TABLET ORAL EVERY 6 HOURS
Qty: 0 | Refills: 0 | Status: DISCONTINUED | OUTPATIENT
Start: 2019-02-27 | End: 2019-03-01

## 2019-02-27 RX ORDER — OXYCODONE HYDROCHLORIDE 5 MG/1
5 TABLET ORAL EVERY 4 HOURS
Qty: 0 | Refills: 0 | Status: DISCONTINUED | OUTPATIENT
Start: 2019-02-27 | End: 2019-03-01

## 2019-02-27 RX ORDER — SODIUM CHLORIDE 0.65 %
1 AEROSOL, SPRAY (ML) NASAL
Qty: 0 | Refills: 0 | Status: DISCONTINUED | OUTPATIENT
Start: 2019-02-27 | End: 2019-03-01

## 2019-02-27 RX ORDER — IBUPROFEN 200 MG
600 TABLET ORAL EVERY 6 HOURS
Qty: 0 | Refills: 0 | Status: COMPLETED | OUTPATIENT
Start: 2019-02-27 | End: 2020-01-26

## 2019-02-27 RX ORDER — ACETAMINOPHEN 500 MG
975 TABLET ORAL EVERY 6 HOURS
Qty: 0 | Refills: 0 | Status: COMPLETED | OUTPATIENT
Start: 2019-02-27 | End: 2020-01-26

## 2019-02-27 RX ORDER — ACETAMINOPHEN 500 MG
975 TABLET ORAL EVERY 6 HOURS
Qty: 0 | Refills: 0 | Status: DISCONTINUED | OUTPATIENT
Start: 2019-02-27 | End: 2019-03-01

## 2019-02-27 RX ADMIN — Medication 600 MILLIGRAM(S): at 00:10

## 2019-02-27 RX ADMIN — Medication 975 MILLIGRAM(S): at 18:40

## 2019-02-27 RX ADMIN — HEPARIN SODIUM 5000 UNIT(S): 5000 INJECTION INTRAVENOUS; SUBCUTANEOUS at 09:50

## 2019-02-27 RX ADMIN — Medication 200 MILLIGRAM(S): at 12:52

## 2019-02-27 RX ADMIN — Medication 975 MILLIGRAM(S): at 09:51

## 2019-02-27 RX ADMIN — Medication 975 MILLIGRAM(S): at 00:10

## 2019-02-27 RX ADMIN — HEPARIN SODIUM 5000 UNIT(S): 5000 INJECTION INTRAVENOUS; SUBCUTANEOUS at 21:03

## 2019-02-27 RX ADMIN — Medication 975 MILLIGRAM(S): at 10:25

## 2019-02-27 RX ADMIN — Medication 975 MILLIGRAM(S): at 18:08

## 2019-02-27 RX ADMIN — Medication 200 MILLIGRAM(S): at 04:31

## 2019-02-27 RX ADMIN — SODIUM CHLORIDE 50 MILLILITER(S): 9 INJECTION, SOLUTION INTRAVENOUS at 02:05

## 2019-02-27 RX ADMIN — Medication 50 GM/HR: at 02:04

## 2019-02-27 RX ADMIN — Medication 1 SPRAY(S): at 12:52

## 2019-02-27 RX ADMIN — Medication 200 MILLIGRAM(S): at 21:02

## 2019-02-27 NOTE — PROGRESS NOTE ADULT - ASSESSMENT
22y/o  PPD#8 from , HD#2 readmitted with PP PEC (H/A, BP) in stable condition. Pt is status post Mg for seizure ppx, on labetalol 200 tID and procardia 30XL

## 2019-02-27 NOTE — PROGRESS NOTE ADULT - PROBLEM SELECTOR PLAN 1
-s/p Mg for seizure ppx  -BPs elevated overnight, procardia 30XL added. Will monitor today  -Neuro consult for persistent headache, CT Venogram shows sinus opacification, otherwise wnl. no sx of sinus infection currently. Will monitor headache and appreciate neuro recs  -recovering well from     Yulia, PGY-3

## 2019-02-27 NOTE — PROGRESS NOTE ADULT - SUBJECTIVE AND OBJECTIVE BOX
R3 Postpartum Note    Patient seen and examined at bedside, no acute overnight events. Pt notes continued headache, but improved from yesterday after d/c Mg. 4/10. Denies vision changes, RUQ Tenderness.  Patient is ambulating, voiding spontaneously, passing gas, and tolerating regular diet.     Vital Signs Last 24 Hours  T(C): 36.7 (02-27-19 @ 06:10), Max: 37.1 (02-26-19 @ 18:00)  HR: 81 (02-27-19 @ 06:10) (66 - 92)  BP: 137/80 (02-27-19 @ 06:10) (130/78 - 162/94)  RR: 16 (02-27-19 @ 06:10) (15 - 23)  SpO2: 100% (02-27-19 @ 06:10) (96% - 100%)    Physical Exam:  General: NAD  Abdomen: Soft, non-tender, non-distended, fundus firm  Pelvic: Lochia wnl    Labs:    Blood Type: O Positive  Antibody Screen: Negative  RPR: Negative               10.5   7.74  )-----------( 414      ( 02-26 @ 04:02 )             31.3         MEDICATIONS  (STANDING):  heparin  Injectable 5000 Unit(s) SubCutaneous every 12 hours  labetalol 200 milliGRAM(s) Oral three times a day  lactated ringers. 1000 milliLiter(s) (50 mL/Hr) IV Continuous <Continuous>  NIFEdipine XL 30 milliGRAM(s) Oral daily    MEDICATIONS  (PRN):

## 2019-02-28 RX ORDER — AZITHROMYCIN 500 MG/1
250 TABLET, FILM COATED ORAL EVERY 24 HOURS
Qty: 0 | Refills: 0 | Status: DISCONTINUED | OUTPATIENT
Start: 2019-03-01 | End: 2019-03-01

## 2019-02-28 RX ORDER — AZITHROMYCIN 500 MG/1
500 TABLET, FILM COATED ORAL ONCE
Qty: 0 | Refills: 0 | Status: COMPLETED | OUTPATIENT
Start: 2019-02-28 | End: 2019-02-28

## 2019-02-28 RX ORDER — LORATADINE 10 MG/1
10 TABLET ORAL DAILY
Qty: 0 | Refills: 0 | Status: DISCONTINUED | OUTPATIENT
Start: 2019-02-28 | End: 2019-03-01

## 2019-02-28 RX ORDER — FLUTICASONE PROPIONATE 50 MCG
1 SPRAY, SUSPENSION NASAL
Qty: 0 | Refills: 0 | Status: DISCONTINUED | OUTPATIENT
Start: 2019-02-28 | End: 2019-03-01

## 2019-02-28 RX ADMIN — Medication 30 MILLIGRAM(S): at 00:21

## 2019-02-28 RX ADMIN — Medication 975 MILLIGRAM(S): at 12:55

## 2019-02-28 RX ADMIN — Medication 200 MILLIGRAM(S): at 22:29

## 2019-02-28 RX ADMIN — AZITHROMYCIN 500 MILLIGRAM(S): 500 TABLET, FILM COATED ORAL at 15:46

## 2019-02-28 RX ADMIN — Medication 975 MILLIGRAM(S): at 12:25

## 2019-02-28 RX ADMIN — HEPARIN SODIUM 5000 UNIT(S): 5000 INJECTION INTRAVENOUS; SUBCUTANEOUS at 10:25

## 2019-02-28 RX ADMIN — Medication 200 MILLIGRAM(S): at 05:18

## 2019-02-28 RX ADMIN — Medication 200 MILLIGRAM(S): at 14:14

## 2019-02-28 RX ADMIN — HEPARIN SODIUM 5000 UNIT(S): 5000 INJECTION INTRAVENOUS; SUBCUTANEOUS at 22:14

## 2019-02-28 RX ADMIN — Medication 975 MILLIGRAM(S): at 05:45

## 2019-02-28 RX ADMIN — Medication 1 SPRAY(S): at 15:45

## 2019-02-28 RX ADMIN — LORATADINE 10 MILLIGRAM(S): 10 TABLET ORAL at 15:46

## 2019-02-28 RX ADMIN — Medication 975 MILLIGRAM(S): at 05:18

## 2019-02-28 NOTE — PROGRESS NOTE ADULT - PROBLEM SELECTOR PLAN 1
-s/p Mg for seizure ppx  -BPs mostly well controlled on procardia 30XL and lab 200 TID (one elevated BP when pt due medication)  -headache mostly resolved, continue tylenol for analgesia prn  -recovering well from   -likely d/c today  Yulia, PGY-3

## 2019-02-28 NOTE — PROGRESS NOTE ADULT - SUBJECTIVE AND OBJECTIVE BOX
R3 Postpartum Note    Patient seen and examined at bedside, no acute overnight events. No acute complaints, pain well controlled. Notes mild headache 2/10 (much improved). Denies visual changes, abdominal pain.  Patient is ambulating, voiding spontaneously, passing gas, and tolerating regular diet.     Vital Signs Last 24 Hours  T(C): 36.7 (02-28-19 @ 05:09), Max: 37.2 (02-27-19 @ 17:25)  HR: 91 (02-28-19 @ 05:09) (67 - 95)  BP: 136/80 (02-28-19 @ 05:09) (125/88 - 160/95)  RR: 17 (02-28-19 @ 05:09) (17 - 18)  SpO2: 99% (02-28-19 @ 05:09) (99% - 100%)    Physical Exam:  General: NAD  Abdomen: Soft, non-tender, non-distended, fundus firm  Pelvic: Lochia wnl    Labs:    Blood Type: O Positive  Antibody Screen: Negative  RPR: Negative               10.5   7.74  )-----------( 414      ( 02-26 @ 04:02 )             31.3         MEDICATIONS  (STANDING):  acetaminophen   Tablet .. 975 milliGRAM(s) Oral every 6 hours  heparin  Injectable 5000 Unit(s) SubCutaneous every 12 hours  ibuprofen  Tablet. 600 milliGRAM(s) Oral every 6 hours  labetalol 200 milliGRAM(s) Oral three times a day  lactated ringers. 1000 milliLiter(s) (50 mL/Hr) IV Continuous <Continuous>  NIFEdipine XL 30 milliGRAM(s) Oral daily  oxyCODONE    IR 5 milliGRAM(s) Oral every 3 hours    MEDICATIONS  (PRN):  oxyCODONE    IR 5 milliGRAM(s) Oral every 4 hours PRN Severe Pain (7 -10)  sodium chloride 0.65% Nasal 1 Spray(s) Both Nostrils two times a day PRN Nasal Congestion

## 2019-02-28 NOTE — PROGRESS NOTE ADULT - ASSESSMENT
22y/o  PPD#9 from , HD#3 readmitted with PP PEC (H/A, BP) in stable condition. Pt is status post Mg for seizure ppx, on labetalol 200 tID and procardia 30XL

## 2019-03-01 ENCOUNTER — TRANSCRIPTION ENCOUNTER (OUTPATIENT)
Age: 24
End: 2019-03-01

## 2019-03-01 VITALS
HEART RATE: 89 BPM | TEMPERATURE: 98 F | DIASTOLIC BLOOD PRESSURE: 89 MMHG | SYSTOLIC BLOOD PRESSURE: 137 MMHG | OXYGEN SATURATION: 99 % | RESPIRATION RATE: 18 BRPM

## 2019-03-01 PROCEDURE — 99238 HOSP IP/OBS DSCHRG MGMT 30/<: CPT

## 2019-03-01 RX ORDER — LABETALOL HCL 100 MG
1 TABLET ORAL
Qty: 90 | Refills: 0 | OUTPATIENT
Start: 2019-03-01 | End: 2019-03-30

## 2019-03-01 RX ORDER — AZITHROMYCIN 500 MG/1
1 TABLET, FILM COATED ORAL
Qty: 4 | Refills: 0 | OUTPATIENT
Start: 2019-03-01 | End: 2019-03-04

## 2019-03-01 RX ORDER — NIFEDIPINE 30 MG
1 TABLET, EXTENDED RELEASE 24 HR ORAL
Qty: 30 | Refills: 0 | OUTPATIENT
Start: 2019-03-01 | End: 2019-03-30

## 2019-03-01 RX ORDER — FLUTICASONE PROPIONATE 50 MCG
1 SPRAY, SUSPENSION NASAL
Qty: 1 | Refills: 0 | OUTPATIENT
Start: 2019-03-01

## 2019-03-01 RX ORDER — LORATADINE 10 MG/1
1 TABLET ORAL
Qty: 7 | Refills: 0 | OUTPATIENT
Start: 2019-03-01 | End: 2019-03-07

## 2019-03-01 RX ADMIN — Medication 200 MILLIGRAM(S): at 06:49

## 2019-03-01 RX ADMIN — Medication 200 MILLIGRAM(S): at 13:52

## 2019-03-01 RX ADMIN — Medication 30 MILLIGRAM(S): at 00:26

## 2019-03-01 RX ADMIN — LORATADINE 10 MILLIGRAM(S): 10 TABLET ORAL at 13:52

## 2019-03-01 RX ADMIN — AZITHROMYCIN 250 MILLIGRAM(S): 500 TABLET, FILM COATED ORAL at 13:52

## 2019-03-01 RX ADMIN — HEPARIN SODIUM 5000 UNIT(S): 5000 INJECTION INTRAVENOUS; SUBCUTANEOUS at 10:44

## 2019-03-01 RX ADMIN — Medication 1 SPRAY(S): at 06:40

## 2019-03-01 RX ADMIN — Medication 30 MILLIGRAM(S): at 00:30

## 2019-03-01 NOTE — DISCHARGE NOTE OB - HOSPITAL COURSE
Pt was admitted with elevated blood pressures and headache, meeting criteria for postpartum preeclampsia. Pt was given magnesium for seizure prophylaxis. BP meds were titrated until well controlled. Pt is currently on labetalol 200 TID and procardia 30XL. Neurology consult was obtained due to persistent headache. CT Head without contrast and CT Venogram obtained. Opacification of maxillary sinus noted. Due to headache and symptoms of congestion and sinus pressure, pt was started on azithromycin, flonase, and claritin. Resolution of headache noted. On day of discharge, pt was feeling well with no headache, stable vital signs. Pt to follow up in clinic next week for BP check.

## 2019-03-01 NOTE — DISCHARGE NOTE OB - CARE PLAN
Principal Discharge DX:	Preeclampsia in postpartum period  Goal:	recovery  Assessment and plan of treatment:	please take BP meds as instructed  Please call office if having headache, blurry vision, abdominal pain.   Please take antibiotics as instructed for full course. Please call if experiencing headache and fevers, chills.

## 2019-03-01 NOTE — DISCHARGE NOTE OB - PROVIDER TOKENS
FREE:[LAST:[HRC],PHONE:[phone]],FAX:[(   )    -],ADDRESS:[[facility], Oncology Building, Ambulatory Care Unit, 3rd floor]]

## 2019-03-01 NOTE — DISCHARGE NOTE OB - CARE PROVIDER_API CALL
[facility], Oncology Building, Ambulatory Care Unit, 3rd floor  Phone: [phone]  Fax: (   )    -  Follow Up Time:

## 2019-03-01 NOTE — PROGRESS NOTE ADULT - PROBLEM SELECTOR PLAN 1
-s/p Mg for seizure ppx  -BPs mostly well controlled on procardia 30XL and lab 200 TID (one elevated BP when pt due medication)  -currently on azithromycin for likely sinusitis. Headache resolved. c/w flonase, nasal spray, claritin.  -recovering well from   -likely d/c today    Yulia, PGY-3

## 2019-03-01 NOTE — PROGRESS NOTE ADULT - ASSESSMENT
24y/o  PPD#10 from , HD#4readmitted with PP PEC (H/A, BP) in stable condition. CT head and symptoms concerning for sinusitis. Headache now resolved with flonase, claritin and azithromycin. Pt is status post Mg for seizure ppx, on labetalol 200 tID and procardia 30XL

## 2019-03-01 NOTE — PROGRESS NOTE ADULT - SUBJECTIVE AND OBJECTIVE BOX
R3 Postpartum Note    Patient seen and examined at bedside, no acute overnight events. No acute complaints, pain well controlled.  Patient is ambulating, voiding spontaneously, passing gas, and tolerating regular diet. Denies headache, changes in vision, RUQ tenderness, fevers, chills.    Vital Signs Last 24 Hours  T(C): 37 (03-01-19 @ 05:40), Max: 37.2 (02-28-19 @ 10:03)  HR: 72 (03-01-19 @ 05:40) (64 - 98)  BP: 136/64 (03-01-19 @ 05:40) (133/69 - 162/86)  RR: 17 (03-01-19 @ 05:40) (17 - 18)  SpO2: 99% (03-01-19 @ 05:40) (99% - 100%)    Physical Exam:  General: NAD  Abdomen: Soft, non-tender, non-distended, fundus firm  Pelvic: Lochia wnl    Labs:    Blood Type: O Positive  Antibody Screen: Negative  RPR: Negative        MEDICATIONS  (STANDING):  acetaminophen   Tablet .. 975 milliGRAM(s) Oral every 6 hours  azithromycin   Tablet 250 milliGRAM(s) Oral every 24 hours  fluticasone propionate 50 MICROgram(s)/spray Nasal Spray 1 Spray(s) Both Nostrils two times a day  heparin  Injectable 5000 Unit(s) SubCutaneous every 12 hours  ibuprofen  Tablet. 600 milliGRAM(s) Oral every 6 hours  labetalol 200 milliGRAM(s) Oral three times a day  lactated ringers. 1000 milliLiter(s) (50 mL/Hr) IV Continuous <Continuous>  loratadine 10 milliGRAM(s) Oral daily  NIFEdipine XL 30 milliGRAM(s) Oral daily  oxyCODONE    IR 5 milliGRAM(s) Oral every 3 hours    MEDICATIONS  (PRN):  oxyCODONE    IR 5 milliGRAM(s) Oral every 4 hours PRN Severe Pain (7 -10)  sodium chloride 0.65% Nasal 1 Spray(s) Both Nostrils two times a day PRN Nasal Congestion

## 2019-03-01 NOTE — DISCHARGE NOTE OB - PATIENT PORTAL LINK FT
You can access the [facility] Portal, offered by [facility], by registering with the following website: [email or web address]

## 2019-03-01 NOTE — DISCHARGE NOTE OB - PLAN OF CARE
recovery please take BP meds as instructed  Please call office if having headache, blurry vision, abdominal pain.   Please take antibiotics as instructed for full course. Please call if experiencing headache and fevers, chills.

## 2019-03-01 NOTE — DISCHARGE NOTE OB - MEDICATION SUMMARY - MEDICATIONS TO TAKE
I will START or STAY ON the medications listed below when I get home from the hospital:    loratadine 10 mg oral tablet  -- 1 tab(s) by mouth once a day  -- Indication: For sinusitis    labetalol 200 mg oral tablet  -- 1 tab(s) by mouth 3 times a day  -- Indication: For Preeclampsia in postpartum period    NIFEdipine 30 mg oral tablet, extended release  -- 1 tab(s) by mouth once a day  -- Indication: For Preeclampsia in postpartum period    Prenatal 1 oral capsule  -- 1 cap(s) by mouth once a day  -- Indication: For home med    azithromycin 250 mg oral tablet  -- 1 tab(s) by mouth every 24 hours  -- Indication: For sinusitis    fluticasone 50 mcg/inh nasal spray  -- 1 spray(s) into nose 2 times a day  -- Indication: For sinusitis

## 2019-03-01 NOTE — PROGRESS NOTE ADULT - ASSESSMENT
Ms. Clancy is a very pleasant 24y/o  PPD#10 from , HD#4 readmitted with PP preeclampsia (H/A, BP).    Her CT head and symptoms were concerning for sinusitis. Headache now resolved with flonase, claritin and azithromycin. Complete course of azithromycin for sinusitis.  Pt is status post Mg for seizure ppx  BP controlled on labetalol 200 tID and procardia 30XL  Stable for discharge home. BP follow up in clinic within a week  Breastfeeding     Pt. seen at bedside and plan discussed with Dr. Joyce Menendez MD  Fellow, [facility]

## 2019-03-01 NOTE — PROGRESS NOTE ADULT - SUBJECTIVE AND OBJECTIVE BOX
Patient seen and examined at bedside. No acute complaints, pain well controlled.   Denies headache, changes in vision, RUQ tenderness. No fever/chills/shortness of breath  Desires to go home today    Vital Signs Last 24 Hours  T(C): 37 (03-01-19 @ 05:40), Max: 37.2 (02-28-19 @ 10:03)  HR: 72 (03-01-19 @ 05:40) (64 - 98)  BP: 136/64 (03-01-19 @ 05:40) (133/69 - 162/86)  RR: 17 (03-01-19 @ 05:40) (17 - 18)  SpO2: 99% (03-01-19 @ 05:40) (99% - 100%)    Physical Exam:  General: NAD  Abdomen: Soft, non-tender, non-distended, fundus firm  Pelvic: Lochia wnl

## 2019-03-04 ENCOUNTER — APPOINTMENT (OUTPATIENT)
Dept: OBGYN | Facility: HOSPITAL | Age: 24
End: 2019-03-04

## 2019-03-04 ENCOUNTER — OUTPATIENT (OUTPATIENT)
Dept: OUTPATIENT SERVICES | Facility: HOSPITAL | Age: 24
LOS: 1 days | End: 2019-03-04

## 2019-03-04 VITALS — SYSTOLIC BLOOD PRESSURE: 126 MMHG | DIASTOLIC BLOOD PRESSURE: 72 MMHG | WEIGHT: 184 LBS | HEART RATE: 90 BPM

## 2019-03-04 DIAGNOSIS — Z98.890 OTHER SPECIFIED POSTPROCEDURAL STATES: Chronic | ICD-10-CM

## 2019-03-04 NOTE — HISTORY OF PRESENT ILLNESS
[Postpartum Follow Up] : postpartum follow up [Complications:___] : no complications [Last Pap Date: ___] : Last Pap Date: [unfilled] [Delivery Date: ___] : on [unfilled] [] : delivered by vaginal delivery [Female] : Delivery History: baby girl [Wt. ___] : weighing [unfilled] [Breastfeeding] : currently nursing [Discharge HCT: ___] : hematocrit level was [unfilled] [Discharge HGB: ___] : hemoglobin level was [unfilled] [Doing Well] : is doing well [No [place]] : to avoid sexual intercourse [No Tampons/Suppositories] : against using tampons or vaginal suppositories [Limited ADLs] : to participate in activities of daily living with limitations [Limited Work] : to work with limitations [Limited Housework] : to do housework with limitations [No Sports] : not to participate in sports [FreeTextEntry8] : Pt presents today for a BP check  [de-identified] : PROM at 36 weeks, Betamethasone previously given, PP PEC developed 1 weeks PP, Sinusitis  [de-identified] : Taking Procardia 30mg QD and Labetolol 200mg TID for PEC, Taking Laratadine 10mg QD, Azithromycin 250mg daily for Sinusitis. Baby in NICU due to maternal fever, baby was subsequently treated for NEC, Baby taking breast-milk in bottle [de-identified] : c/o hemorrhoids  [de-identified] : BP today 126/72. [de-identified] : Cont. all meds. Take BP daily with blood pressure cuff. Parameters explained. Consider d/c procardia NV if BP is dropping. RTC 1 week

## 2019-03-13 ENCOUNTER — APPOINTMENT (OUTPATIENT)
Dept: OBGYN | Facility: HOSPITAL | Age: 24
End: 2019-03-13

## 2019-03-20 ENCOUNTER — APPOINTMENT (OUTPATIENT)
Dept: OBGYN | Facility: HOSPITAL | Age: 24
End: 2019-03-20

## 2019-03-27 ENCOUNTER — APPOINTMENT (OUTPATIENT)
Dept: OBGYN | Facility: HOSPITAL | Age: 24
End: 2019-03-27

## 2019-03-27 DIAGNOSIS — O09.90 SUPERVISION OF HIGH RISK PREGNANCY, UNSPECIFIED, UNSPECIFIED TRIMESTER: ICD-10-CM

## 2019-03-27 DIAGNOSIS — Z34.92 ENCOUNTER FOR SUPERVISION OF NORMAL PREGNANCY, UNSPECIFIED, SECOND TRIMESTER: ICD-10-CM

## 2019-03-27 DIAGNOSIS — O26.879 CERVICAL SHORTENING, UNSPECIFIED TRIMESTER: ICD-10-CM

## 2019-03-27 DIAGNOSIS — B96.89 ACUTE VAGINITIS: ICD-10-CM

## 2019-03-27 DIAGNOSIS — Z34.83 ENCOUNTER FOR SUPERVISION OF OTHER NORMAL PREGNANCY, THIRD TRIMESTER: ICD-10-CM

## 2019-03-27 DIAGNOSIS — Z13.6 ENCOUNTER FOR SCREENING FOR CARDIOVASCULAR DISORDERS: ICD-10-CM

## 2019-03-27 DIAGNOSIS — Z86.79 PERSONAL HISTORY OF OTHER DISEASES OF THE CIRCULATORY SYSTEM: ICD-10-CM

## 2019-03-27 DIAGNOSIS — N76.0 ACUTE VAGINITIS: ICD-10-CM

## 2019-03-27 DIAGNOSIS — Z92.29 PERSONAL HISTORY OF OTHER DRUG THERAPY: ICD-10-CM

## 2019-03-27 DIAGNOSIS — B37.9 CANDIDIASIS, UNSPECIFIED: ICD-10-CM

## 2019-04-20 ENCOUNTER — TRANSCRIPTION ENCOUNTER (OUTPATIENT)
Age: 24
End: 2019-04-20

## 2019-07-10 PROCEDURE — 86900 BLOOD TYPING SEROLOGIC ABO: CPT

## 2019-07-10 PROCEDURE — G0463: CPT

## 2019-07-10 PROCEDURE — 59025 FETAL NON-STRESS TEST: CPT

## 2019-07-10 PROCEDURE — 85027 COMPLETE CBC AUTOMATED: CPT

## 2019-07-10 PROCEDURE — 86901 BLOOD TYPING SEROLOGIC RH(D): CPT

## 2019-07-10 PROCEDURE — 86780 TREPONEMA PALLIDUM: CPT

## 2019-07-10 PROCEDURE — 84443 ASSAY THYROID STIM HORMONE: CPT

## 2019-07-10 PROCEDURE — 86850 RBC ANTIBODY SCREEN: CPT

## 2019-08-01 ENCOUNTER — APPOINTMENT (OUTPATIENT)
Dept: OBGYN | Facility: HOSPITAL | Age: 24
End: 2019-08-01

## 2020-06-11 ENCOUNTER — APPOINTMENT (OUTPATIENT)
Dept: OBGYN | Facility: HOSPITAL | Age: 25
End: 2020-06-11

## 2020-12-20 NOTE — DISCHARGE NOTE OB - PRO FEEDING PLAN INFANT OB
Patient Education   serenease follow up with PCP in 3 days, dont start abx until test results are back or instructed by PCP. Increase PO fluids.   Understanding Urinary Tract Infections (UTIs)  Most UTIs are caused by bacteria, although they may also be caused by viruses or fungi. Bacteria from the bowel are the most common source of infection. The infection may start because of any of the following:  · Sexual activity. During sex, bacteria can travel from the penis, vagina, or rectum into the urethra.   · Bacteria on the skin outside the rectum may travel into the urethra. This is more common in women since the rectum and urethra are closer to each other than in men. Wiping from front to back after using the toilet and keeping the area clean can help prevent germs from getting to the urethra.  · Blockage of urine flow through the urinary tract. If urine sits too long, germs may start to grow out of control.      Parts of the urinary tract  The infection can occur in any part of the urinary tract.  · The kidneys collect and store urine.  · The ureters carry urine from the kidneys to the bladder.  · The bladder holds urine until you are ready to let it out.  · The urethra carries urine from the bladder out of the body. It is shorter in women, so bacteria can move through it more easily. The urethra is longer in men, so a UTI is less likely to reach the bladder or kidneys in men.  Date Last Reviewed: 1/1/2017 © 2000-2018 [employer]. [address] [place] [address], [place] [address]. All rights reserved. This information is not intended as a substitute for professional medical care. Always follow your healthcare professional's instructions.           
breastfeeding exclusively

## 2023-10-09 NOTE — ED ADULT NURSE NOTE - NEURO WDL
"Last ov 12/15/22    Machine displaying \"motor life exceeded\"    Order for replacement device pended.     Patient uses [facility]    Jenna LOYA RN  [facility]       "
Order and last ov faxed to Eleuterio LOYA RN  [facility]    
Alert and oriented to person, place and time, memory intact, behavior appropriate to situation, PERRL.
